# Patient Record
Sex: FEMALE | Race: WHITE | Employment: UNEMPLOYED | URBAN - METROPOLITAN AREA
[De-identification: names, ages, dates, MRNs, and addresses within clinical notes are randomized per-mention and may not be internally consistent; named-entity substitution may affect disease eponyms.]

---

## 2017-01-12 RX ORDER — CLONAZEPAM 1 MG/1
TABLET ORAL
Qty: 30 TABLET | Refills: 0 | Status: SHIPPED | OUTPATIENT
Start: 2017-01-12 | End: 2017-03-14

## 2017-01-12 RX ORDER — ZOLPIDEM TARTRATE 10 MG/1
TABLET ORAL
Qty: 30 TABLET | Refills: 0 | Status: SHIPPED | OUTPATIENT
Start: 2017-01-12 | End: 2017-04-13

## 2017-01-23 ENCOUNTER — OFFICE VISIT (OUTPATIENT)
Dept: PODIATRY CLINIC | Facility: CLINIC | Age: 52
End: 2017-01-23

## 2017-01-23 DIAGNOSIS — M72.2 PLANTAR FASCIITIS: ICD-10-CM

## 2017-01-23 DIAGNOSIS — M21.969 FOOT DEFORMITY, UNSPECIFIED LATERALITY: ICD-10-CM

## 2017-01-23 DIAGNOSIS — B35.1 ONYCHOMYCOSIS OF TOENAIL: ICD-10-CM

## 2017-01-23 DIAGNOSIS — M89.8X9 BONY EXOSTOSIS: Primary | ICD-10-CM

## 2017-01-23 PROCEDURE — 99213 OFFICE O/P EST LOW 20 MIN: CPT

## 2017-01-23 PROCEDURE — L3060 FOOT ARCH SUPP LONGITUD/META: HCPCS

## 2017-01-23 NOTE — PROGRESS NOTES
HPI:    Patient ID: Zay Negrete is a 45year old female. Foot Pain   The pain is present in the left foot (forefoot (after activity which was relived with rest)). This is a recurrent problem. There has been no history of extremity trauma.  The pr HYSTERECTOMY  2013    Comment Supracervical BYRON      Family History   Problem Relation Age of Onset   • Heart Disorder Maternal Grandmother      mitral valve repair   • Alcohol and Other Disorders Associated Brother      ETOH abuse   • Other [Other] Jessica Ivan significantly following cortisone injection during last visit. On exam - dorsal exostosis of left foot otherwise unremarkable  We have discussed treatement options with the patient. The patient inidcates her understanding and  agrees to the the options.  Thamas Necessary

## 2017-02-10 PROBLEM — M50.30 DEGENERATION OF CERVICAL INTERVERTEBRAL DISC: Status: ACTIVE | Noted: 2017-02-10

## 2017-02-10 PROBLEM — M79.18 MYOFASCIAL PAIN: Status: ACTIVE | Noted: 2017-02-10

## 2017-02-10 PROBLEM — M47.812 FACET ARTHRITIS OF CERVICAL REGION: Status: ACTIVE | Noted: 2017-02-10

## 2017-03-15 RX ORDER — CLONAZEPAM 1 MG/1
TABLET ORAL
Qty: 30 TABLET | Refills: 0 | Status: SHIPPED | OUTPATIENT
Start: 2017-03-15 | End: 2017-06-13

## 2017-03-27 ENCOUNTER — OFFICE VISIT (OUTPATIENT)
Dept: ENDOCRINOLOGY CLINIC | Facility: CLINIC | Age: 52
End: 2017-03-27

## 2017-03-27 VITALS
HEART RATE: 65 BPM | SYSTOLIC BLOOD PRESSURE: 113 MMHG | WEIGHT: 140 LBS | BODY MASS INDEX: 26.43 KG/M2 | HEIGHT: 61 IN | DIASTOLIC BLOOD PRESSURE: 73 MMHG

## 2017-03-27 DIAGNOSIS — Z78.0 MENOPAUSE: ICD-10-CM

## 2017-03-27 DIAGNOSIS — E03.9 HYPOTHYROIDISM, UNSPECIFIED TYPE: Primary | ICD-10-CM

## 2017-03-27 DIAGNOSIS — N95.1 HOT FLASHES DUE TO MENOPAUSE: ICD-10-CM

## 2017-03-27 PROCEDURE — 99212 OFFICE O/P EST SF 10 MIN: CPT | Performed by: INTERNAL MEDICINE

## 2017-03-27 PROCEDURE — 99214 OFFICE O/P EST MOD 30 MIN: CPT | Performed by: INTERNAL MEDICINE

## 2017-03-27 NOTE — PROGRESS NOTES
Return Office Visit     CHIEF COMPLAINT:  Hypothyroidism , Menopause, Hot flashes    HISTORY OF PRESENT ILLNESS:  Aby Tejada is a 47year old female with PMH of hypothyroidism, hysterectomy (oavries intact), fatigue, muscle aches (generalized); wh orthopnea  GI: Negative for:  abdominal pain, nausea, vomiting, diarrhea, constipation, bleeding  Neurology: Negative for: headache, numbness, weakness  Genito-Urinary: Negative for: dysuria, frequency  Psychiatric: Negative for:  depression, anxiety  Hema multivitamins containing Ca/iron  Stressed the importance of compliance with meds  Side effects of noncompliance including the development of myxedema coma and death discussed.     2.  hot flashes:  Will check FSH and estradiol  Will consider a trial of par

## 2017-03-31 ENCOUNTER — APPOINTMENT (OUTPATIENT)
Dept: LAB | Age: 52
End: 2017-03-31
Attending: INTERNAL MEDICINE
Payer: COMMERCIAL

## 2017-03-31 DIAGNOSIS — Z78.0 MENOPAUSE: ICD-10-CM

## 2017-03-31 DIAGNOSIS — E03.9 HYPOTHYROIDISM, UNSPECIFIED TYPE: ICD-10-CM

## 2017-03-31 PROCEDURE — 83001 ASSAY OF GONADOTROPIN (FSH): CPT

## 2017-03-31 PROCEDURE — 82670 ASSAY OF TOTAL ESTRADIOL: CPT

## 2017-03-31 PROCEDURE — 84443 ASSAY THYROID STIM HORMONE: CPT

## 2017-03-31 PROCEDURE — 84439 ASSAY OF FREE THYROXINE: CPT

## 2017-03-31 PROCEDURE — 36415 COLL VENOUS BLD VENIPUNCTURE: CPT

## 2017-04-02 ENCOUNTER — TELEPHONE (OUTPATIENT)
Dept: ENDOCRINOLOGY CLINIC | Facility: CLINIC | Age: 52
End: 2017-04-02

## 2017-04-02 NOTE — TELEPHONE ENCOUNTER
Please let the patient know that her labs indicate that she is post menopausal.  She has been having hot flashes, would she want to try paroxetine as discussed. If she wants to try, can give a one month of paroxetine 7.5 mg daily at bedtime.   She should n

## 2017-04-03 RX ORDER — PAROXETINE 7.5 MG/1
7.5 CAPSULE ORAL NIGHTLY
Qty: 30 CAPSULE | Refills: 0 | Status: SHIPPED | OUTPATIENT
Start: 2017-04-03 | End: 2017-05-03

## 2017-04-03 NOTE — TELEPHONE ENCOUNTER
Called patient. Informed her of Dr. Bradley Juárez message below. Patient would like to try Paroxetine. She understands not to stop in abruptly. Pended order and confirmed pharmacy.  Please review medication as I am not familiar with it and want to make sure i

## 2017-04-12 RX ORDER — LEVOTHYROXINE SODIUM 112 UG/1
TABLET ORAL
Qty: 30 TABLET | Refills: 6 | Status: SHIPPED | OUTPATIENT
Start: 2017-04-12 | End: 2017-11-01

## 2017-04-17 RX ORDER — ZOLPIDEM TARTRATE 10 MG/1
TABLET ORAL
Qty: 30 TABLET | Refills: 0 | Status: SHIPPED | OUTPATIENT
Start: 2017-04-17 | End: 2017-07-11

## 2017-04-17 NOTE — TELEPHONE ENCOUNTER
· PLEASE ADVISE ON REFILL. THANKS.    Recent Visits       Provider Department Primary Dx    8 months ago Brian Wilkinson DO [address] Family Medicine [place]    10 months ago Carlota , [address] [place] [address], [place] [id number], [place] Dysuria

## 2017-06-15 RX ORDER — CLONAZEPAM 1 MG/1
TABLET ORAL
Qty: 30 TABLET | Refills: 0 | Status: SHIPPED | OUTPATIENT
Start: 2017-06-15 | End: 2017-10-06

## 2017-07-12 RX ORDER — ZOLPIDEM TARTRATE 10 MG/1
TABLET ORAL
Qty: 30 TABLET | Refills: 0 | Status: SHIPPED | OUTPATIENT
Start: 2017-07-12 | End: 2017-09-13

## 2017-09-13 RX ORDER — ZOLPIDEM TARTRATE 10 MG/1
TABLET ORAL
Qty: 30 TABLET | Refills: 0 | Status: SHIPPED | OUTPATIENT
Start: 2017-09-13 | End: 2017-09-13

## 2017-09-13 RX ORDER — ZOLPIDEM TARTRATE 10 MG/1
TABLET ORAL
Qty: 30 TABLET | Refills: 0 | OUTPATIENT
Start: 2017-09-13 | End: 2017-12-15

## 2017-09-13 NOTE — TELEPHONE ENCOUNTER
Phoned in [facility] to pts pharmacy - Spoke with pts pharmacistAlejandro name as pt]/CURTIS SARMIENTO

## 2017-10-07 RX ORDER — CLONAZEPAM 1 MG/1
TABLET ORAL
Qty: 30 TABLET | Refills: 0 | OUTPATIENT
Start: 2017-10-07 | End: 2017-12-15

## 2017-11-01 RX ORDER — LEVOTHYROXINE SODIUM 112 UG/1
TABLET ORAL
Qty: 30 TABLET | Refills: 6 | Status: SHIPPED | OUTPATIENT
Start: 2017-11-01 | End: 2018-05-10

## 2017-11-01 NOTE — TELEPHONE ENCOUNTER
Please advise on refill request   Last office visit 8/5/16    Phone Room please call the patient and schedule an appointment. Thanks.       Hypothyroid Medications  Protocol Criteria:  Appointment scheduled in the past 12 months or the next 3 months  TSH

## 2017-11-30 ENCOUNTER — HOSPITAL ENCOUNTER (OUTPATIENT)
Dept: GENERAL RADIOLOGY | Age: 52
Discharge: HOME | End: 2017-11-30
Attending: PODIATRIST
Payer: COMMERCIAL

## 2017-11-30 ENCOUNTER — OFFICE VISIT (OUTPATIENT)
Dept: PODIATRY CLINIC | Facility: CLINIC | Age: 52
End: 2017-11-30

## 2017-11-30 DIAGNOSIS — M79.672 LEFT FOOT PAIN: ICD-10-CM

## 2017-11-30 DIAGNOSIS — M20.12 HALLUX VALGUS OF LEFT FOOT: ICD-10-CM

## 2017-11-30 DIAGNOSIS — M79.672 LEFT FOOT PAIN: Primary | ICD-10-CM

## 2017-11-30 PROCEDURE — 99212 OFFICE O/P EST SF 10 MIN: CPT | Performed by: PODIATRIST

## 2017-11-30 PROCEDURE — 73630 X-RAY EXAM OF FOOT: CPT | Performed by: PODIATRIST

## 2017-11-30 PROCEDURE — 99243 OFF/OP CNSLTJ NEW/EST LOW 30: CPT | Performed by: PODIATRIST

## 2017-12-15 NOTE — TELEPHONE ENCOUNTER
Pt requesting a refill on Ambien &       Current Outpatient Prescriptions:  CLONAZEPAM 1 MG Oral Tab TAKE 1 TABLET BY MOUTH EVERY EVENING AS NEEDED FOR ANXIETY Disp: 30 tablet Rfl: 0      states she needs it for sleep & pharmacy will not refill until she is seen for her Px

## 2017-12-16 RX ORDER — ZOLPIDEM TARTRATE 10 MG/1
TABLET ORAL
Qty: 30 TABLET | Refills: 0 | OUTPATIENT
Start: 2017-12-16 | End: 2018-03-02

## 2017-12-16 RX ORDER — CLONAZEPAM 1 MG/1
TABLET ORAL
Qty: 30 TABLET | Refills: 0 | OUTPATIENT
Start: 2017-12-16 | End: 2018-03-02

## 2018-01-12 ENCOUNTER — OFFICE VISIT (OUTPATIENT)
Dept: FAMILY MEDICINE CLINIC | Facility: CLINIC | Age: 53
End: 2018-01-12

## 2018-01-12 ENCOUNTER — TELEPHONE (OUTPATIENT)
Dept: PODIATRY CLINIC | Facility: CLINIC | Age: 53
End: 2018-01-12

## 2018-01-12 VITALS
WEIGHT: 144.38 LBS | HEART RATE: 62 BPM | DIASTOLIC BLOOD PRESSURE: 79 MMHG | BODY MASS INDEX: 27.26 KG/M2 | TEMPERATURE: 98 F | HEIGHT: 61 IN | SYSTOLIC BLOOD PRESSURE: 124 MMHG

## 2018-01-12 DIAGNOSIS — F41.0 PANIC ANXIETY SYNDROME: ICD-10-CM

## 2018-01-12 DIAGNOSIS — E78.2 MIXED HYPERLIPIDEMIA: ICD-10-CM

## 2018-01-12 DIAGNOSIS — G47.09 OTHER INSOMNIA: ICD-10-CM

## 2018-01-12 DIAGNOSIS — E03.9 HYPOTHYROIDISM, UNSPECIFIED TYPE: ICD-10-CM

## 2018-01-12 DIAGNOSIS — Z00.00 ROUTINE PHYSICAL EXAMINATION: Primary | ICD-10-CM

## 2018-01-12 PROCEDURE — 90471 IMMUNIZATION ADMIN: CPT | Performed by: FAMILY MEDICINE

## 2018-01-12 PROCEDURE — 90715 TDAP VACCINE 7 YRS/> IM: CPT | Performed by: FAMILY MEDICINE

## 2018-01-12 PROCEDURE — 99396 PREV VISIT EST AGE 40-64: CPT | Performed by: FAMILY MEDICINE

## 2018-01-12 PROCEDURE — 90686 IIV4 VACC NO PRSV 0.5 ML IM: CPT | Performed by: FAMILY MEDICINE

## 2018-01-12 PROCEDURE — 90472 IMMUNIZATION ADMIN EACH ADD: CPT | Performed by: FAMILY MEDICINE

## 2018-01-12 NOTE — TELEPHONE ENCOUNTER
Call to [facility].  Aware return call from Karla Fall may  not be today and may be next week

## 2018-01-12 NOTE — PROGRESS NOTES
Blood pressure 124/79, pulse 62, temperature 97.6 °F (36.4 °C), temperature source Oral, height 5' 1\" (1.549 m), weight 144 lb 6.4 oz (65.5 kg), last menstrual period 10/17/2008.     REASON FOR VISIT:    Shameka Farris is a 47year old female who pres 11/07/2014   CHOLEST 178 10/27/2007   CHOLEST 186 01/26/2006       Lab Results  Component Value Date   HDL 80 11/07/2014   HDL 67 (H) 10/27/2007   HDL 63 (H) 01/26/2006       Lab Results  Component Value Date   TRIGLY 65 11/07/2014   TRIGLY 65 10/27/2007 visit. Fecal Occult Blood  Annually No results found for: FOB, OCCULTSTOOL    Obesity Screening Screen all adults annually Body mass index is 27.28 kg/m².       Preventive Services for Which Recommendations Vary with Risk Recommendation Internal Lab or P GLYCOHEMOGLOBIN (HgA1c) (L) (%)   Date Value   10/14/2015 5.0    No flowsheet data found.     Creat/alb ratio  Annually Creatinine, Serum (mg/dL)   Date Value   11/07/2014 0.77     CREATININE (P) (mg/dL)   Date Value   10/14/2015 0.87        LDL  Annually L TONSILLECTOMY  2005: TUBAL LIGATION   Family History   Problem Relation Age of Onset   • Blood Disorder Father      PVD   • Other [OTHER] Father      Lupus   • 80.80 [OTHER] Father      Arthiosclerosis   • Stroke Mother    • Heart Disease Mother      CAD clear  EYES:PERRLA, EOMI, normal optic disk, conjunctiva are clear  NECK: supple, no adenopathy, no bruits  CHEST: no chest tenderness  BREAST: no dominant or suspicious mass  LUNGS: clear to auscultation  CARDIO: RRR without murmur  GI: good BS's, no mass GLUCOSE, SERUM; Future    3. Other insomnia  CLONAZEPAM    4. Hypothyroidism, unspecified type  LEVEL ORDERED     5.  Panic anxiety syndrome  STABLE PER PATIENT     LEFT LEG LESION ANTERIOR PATIENT HAS DERMATOLOGY APPOINTMENT IN [place]

## 2018-01-17 NOTE — TELEPHONE ENCOUNTER
Spoke to pt and tentatively scheduled surgery with SCR at [facility] 03/14/18. Pt will call back to confirm this date after checking with her work. Pt has [employer] PPO.    Informed pt that [facility] will call 1-2 days prior to surgery to discuss the following:  - Time

## 2018-01-18 NOTE — TELEPHONE ENCOUNTER
Spoke to pt and she states she will have to schedule surgery a different date. Surgery will be on 04/04/18 at [facility] with SCR. Pt verbalized understanding.

## 2018-01-19 ENCOUNTER — LAB ENCOUNTER (OUTPATIENT)
Dept: LAB | Age: 53
End: 2018-01-19
Attending: FAMILY MEDICINE
Payer: COMMERCIAL

## 2018-01-19 ENCOUNTER — HOSPITAL ENCOUNTER (OUTPATIENT)
Dept: MAMMOGRAPHY | Age: 53
Discharge: HOME | End: 2018-01-19
Attending: FAMILY MEDICINE
Payer: COMMERCIAL

## 2018-01-19 DIAGNOSIS — Z00.00 ROUTINE PHYSICAL EXAMINATION: ICD-10-CM

## 2018-01-19 DIAGNOSIS — E78.2 MIXED HYPERLIPIDEMIA: ICD-10-CM

## 2018-01-19 LAB
ALT SERPL-CCNC: 12 U/L (ref 14–54)
AST SERPL-CCNC: 17 U/L (ref 15–41)
CHOLEST SERPL-MCNC: 209 MG/DL (ref 110–200)
GLUCOSE SERPL-MCNC: 89 MG/DL (ref 70–99)
HDLC SERPL-MCNC: 88 MG/DL
LDLC SERPL CALC-MCNC: 106 MG/DL (ref 0–99)
NONHDLC SERPL-MCNC: 121 MG/DL
T4 FREE SERPL-MCNC: 1 NG/DL (ref 0.58–1.64)
TRIGL SERPL-MCNC: 74 MG/DL (ref 1–149)
TSH SERPL-ACNC: 7.06 UIU/ML (ref 0.45–5.33)

## 2018-01-19 PROCEDURE — 84443 ASSAY THYROID STIM HORMONE: CPT

## 2018-01-19 PROCEDURE — 80061 LIPID PANEL: CPT

## 2018-01-19 PROCEDURE — 82947 ASSAY GLUCOSE BLOOD QUANT: CPT

## 2018-01-19 PROCEDURE — 84439 ASSAY OF FREE THYROXINE: CPT

## 2018-01-19 PROCEDURE — 84460 ALANINE AMINO (ALT) (SGPT): CPT

## 2018-01-19 PROCEDURE — 36415 COLL VENOUS BLD VENIPUNCTURE: CPT

## 2018-01-19 PROCEDURE — 77067 SCR MAMMO BI INCL CAD: CPT | Performed by: FAMILY MEDICINE

## 2018-01-19 PROCEDURE — 84450 TRANSFERASE (AST) (SGOT): CPT

## 2018-01-31 NOTE — TELEPHONE ENCOUNTER
Called [facility] for PA for outpatient surgery. No PA required. Confirmation # [id number]. Faxed surgical scheduling form to [address]

## 2018-02-20 NOTE — TELEPHONE ENCOUNTER
Pt requesting refill on Current Outpatient Prescriptions:  ClonazePAM 1 MG Oral Tab TAKE 1 TABLET BY MOUTH EVERY EVENING AS NEEDED FOR ANXIETY Disp: 30 tablet Rfl: 0   Zolpidem Tartrate 10 MG Oral Tab TAKE 1 TABLET EVERY EVENING AS NEEDED FOR SLEEP Disp: 30 tablet Rfl: 0   Pt states her [facility]. And send rx to new pharmacy [facility] in [address], [address] [facility]

## 2018-02-21 RX ORDER — ZOLPIDEM TARTRATE 10 MG/1
TABLET ORAL
Qty: 30 TABLET | Refills: 0 | OUTPATIENT
Start: 2018-02-21

## 2018-02-21 RX ORDER — CLONAZEPAM 1 MG/1
TABLET ORAL
Qty: 30 TABLET | Refills: 0 | OUTPATIENT
Start: 2018-02-21

## 2018-03-02 NOTE — TELEPHONE ENCOUNTER
Refused by: Altagracia Haji, DO  Refusal reason: Unexpected request, review all meds with pt. Spoke with pt & stated she was seen by Dr Lesli Patel on 1-12-18  She forgot to get a refill of meds at that time, also she changed her pharm for the new year. Pt said she takes both meds ( clonazepam & zolpidem) for sleeping,  if MD is concerned & doesn't want her to take both meds, she is willing to alternate meds. Pt said she only refill both meds 3-4 x a year. She said she takes clonazepam in the middle of the noc if needed & if zolpidem doesn't work. Pt req rx refill. pls advise, thanks in advance. Pending Prescriptions Disp Refills    ClonazePAM 1 MG Oral Tab 30 tablet 0     Sig: TAKE 1 TABLET BY MOUTH EVERY EVENING AS NEEDED FOR ANXIETY      Zolpidem Tartrate 10 MG Oral Tab 30 tablet 0     Sig: TAKE 1 TABLET EVERY EVENING AS NEEDED FOR SLEEP       Refused Prescriptions Disp Refills    ClonazePAM 1 MG Oral Tab 30 tablet 0      Sig: TAKE 1 TABLET BY MOUTH EVERY EVENING AS NEEDED FOR ANXIETY        Refused By: Noemi Stewart        Reason for Refusal: Unexpected request, review all meds with pt. Zolpidem Tartrate 10 MG Oral Tab 30 tablet 0      Sig: TAKE 1 TABLET EVERY EVENING AS NEEDED FOR SLEEP        Refused By: Noemi Stewart        Reason for Refusal: Unexpected request, review all meds with pt.

## 2018-03-05 RX ORDER — CLONAZEPAM 1 MG/1
TABLET ORAL
Qty: 30 TABLET | Refills: 0 | OUTPATIENT
Start: 2018-03-05 | End: 2018-06-13

## 2018-03-05 RX ORDER — ZOLPIDEM TARTRATE 10 MG/1
TABLET ORAL
Qty: 30 TABLET | Refills: 0 | OUTPATIENT
Start: 2018-03-05 | End: 2018-06-13

## 2018-04-06 ENCOUNTER — TELEPHONE (OUTPATIENT)
Dept: PODIATRY CLINIC | Facility: CLINIC | Age: 53
End: 2018-04-06

## 2018-04-06 NOTE — TELEPHONE ENCOUNTER
Surgery 4-4-18, pt called to schedule one week follow up appt. Can pt be seen Thursday 4-12-18 before 1pm or Friday 4-13-18 anytime.   Call pt

## 2018-04-13 ENCOUNTER — OFFICE VISIT (OUTPATIENT)
Dept: PODIATRY CLINIC | Facility: CLINIC | Age: 53
End: 2018-04-13

## 2018-04-13 DIAGNOSIS — M20.12 HALLUX VALGUS OF LEFT FOOT: Primary | ICD-10-CM

## 2018-04-13 PROCEDURE — 99024 POSTOP FOLLOW-UP VISIT: CPT | Performed by: PODIATRIST

## 2018-04-13 PROCEDURE — 99212 OFFICE O/P EST SF 10 MIN: CPT | Performed by: PODIATRIST

## 2018-04-13 RX ORDER — OXICONAZOLE NITRATE 10 MG/G
CREAM TOPICAL
Refills: 0 | COMMUNITY
Start: 2018-01-23 | End: 2018-04-23

## 2018-04-13 RX ORDER — ACETAMINOPHEN AND CODEINE PHOSPHATE 300; 30 MG/1; MG/1
TABLET ORAL
Refills: 0 | COMMUNITY
Start: 2018-04-04 | End: 2018-05-14

## 2018-04-13 NOTE — PROGRESS NOTES
HPI:    Patient ID: Maxine Gutierrez is a 46year old female. HPI  This 50-year-old female presents 1 week post left bunionectomy. She reports no concerns. Review of Systems  I reviewed medical status, medications were noted.        Current Outpatie

## 2018-04-23 ENCOUNTER — OFFICE VISIT (OUTPATIENT)
Dept: PODIATRY CLINIC | Facility: CLINIC | Age: 53
End: 2018-04-23

## 2018-04-23 ENCOUNTER — HOSPITAL ENCOUNTER (OUTPATIENT)
Dept: GENERAL RADIOLOGY | Age: 53
Discharge: HOME | End: 2018-04-23
Attending: PODIATRIST
Payer: COMMERCIAL

## 2018-04-23 DIAGNOSIS — M20.12 HALLUX VALGUS OF LEFT FOOT: ICD-10-CM

## 2018-04-23 DIAGNOSIS — Z47.89 ORTHOPEDIC AFTERCARE: ICD-10-CM

## 2018-04-23 DIAGNOSIS — Z47.89 ORTHOPEDIC AFTERCARE: Primary | ICD-10-CM

## 2018-04-23 PROCEDURE — 99024 POSTOP FOLLOW-UP VISIT: CPT | Performed by: PODIATRIST

## 2018-04-23 PROCEDURE — 99212 OFFICE O/P EST SF 10 MIN: CPT | Performed by: PODIATRIST

## 2018-04-23 PROCEDURE — 73630 X-RAY EXAM OF FOOT: CPT | Performed by: PODIATRIST

## 2018-04-23 NOTE — PROGRESS NOTES
HPI:    Patient ID: Paul Sharp is a 46year old female. HPI  This 54-year-old female presents approximately 2 weeks post left bunionectomy. She has no noted specific concerns or problems.   Review of Systems         Current Outpatient Prescript

## 2018-05-11 ENCOUNTER — TELEPHONE (OUTPATIENT)
Dept: FAMILY MEDICINE CLINIC | Facility: CLINIC | Age: 53
End: 2018-05-11

## 2018-05-11 DIAGNOSIS — E03.8 OTHER SPECIFIED HYPOTHYROIDISM: Primary | ICD-10-CM

## 2018-05-11 RX ORDER — LEVOTHYROXINE SODIUM 112 UG/1
TABLET ORAL
Qty: 30 TABLET | Refills: 0 | Status: SHIPPED | OUTPATIENT
Start: 2018-05-11 | End: 2018-06-13

## 2018-05-14 ENCOUNTER — TELEPHONE (OUTPATIENT)
Dept: PODIATRY CLINIC | Facility: CLINIC | Age: 53
End: 2018-05-14

## 2018-05-14 NOTE — TELEPHONE ENCOUNTER
Pt states she has pain at night from bunionectomy, would like refill on tylenol #3 to use only at night. Pls advise thank you.

## 2018-05-14 NOTE — TELEPHONE ENCOUNTER
Spoke to pt and she is requesting refill of T#3. Pt had a left foot bunionectomy on 04/04/18. Pain comes and goes. Foot has swelling with burning, electrical shock, throbbing type pain. States she has been icing foot for the pain.  States incision site feel

## 2018-05-14 NOTE — TELEPHONE ENCOUNTER
Spoke to pt and informed her that [facility] approved T#3 and I will call it into her pharmacy. Verified pharmacy with pt. Pt verbalized understanding.

## 2018-05-14 NOTE — TELEPHONE ENCOUNTER
[address] [facility] and  with T#3 order and [facility] DAMIEN #.

## 2018-05-15 RX ORDER — ACETAMINOPHEN AND CODEINE PHOSPHATE 300; 30 MG/1; MG/1
TABLET ORAL
Qty: 20 TABLET | Refills: 0 | OUTPATIENT
Start: 2018-05-15 | End: 2018-07-17

## 2018-05-17 ENCOUNTER — OFFICE VISIT (OUTPATIENT)
Dept: PODIATRY CLINIC | Facility: CLINIC | Age: 53
End: 2018-05-17

## 2018-05-17 DIAGNOSIS — M20.12 HALLUX VALGUS OF LEFT FOOT: Primary | ICD-10-CM

## 2018-05-17 PROCEDURE — 99212 OFFICE O/P EST SF 10 MIN: CPT | Performed by: PODIATRIST

## 2018-05-17 PROCEDURE — 99024 POSTOP FOLLOW-UP VISIT: CPT | Performed by: PODIATRIST

## 2018-05-17 NOTE — PROGRESS NOTES
HPI:    Patient ID: Nima Luevano is a 46year old female. HPI  A 2year-old female presents 5 weeks post left bunionectomy. She is quite active and in doing so has a bit of swelling and discomfort towards the end of the day.   She reports no feel

## 2018-06-05 ENCOUNTER — PATIENT MESSAGE (OUTPATIENT)
Dept: FAMILY MEDICINE CLINIC | Facility: CLINIC | Age: 53
End: 2018-06-05

## 2018-06-06 NOTE — TELEPHONE ENCOUNTER
From: Sly Ramos  To: Rich Haider DO  Sent: 6/5/2018 11:28 PM CDT  Subject: Other    I would like to have thyroid tested

## 2018-06-12 ENCOUNTER — LAB ENCOUNTER (OUTPATIENT)
Dept: LAB | Age: 53
End: 2018-06-12
Attending: FAMILY MEDICINE
Payer: COMMERCIAL

## 2018-06-12 DIAGNOSIS — E03.8 OTHER SPECIFIED HYPOTHYROIDISM: ICD-10-CM

## 2018-06-12 PROCEDURE — 84443 ASSAY THYROID STIM HORMONE: CPT

## 2018-06-12 PROCEDURE — 84439 ASSAY OF FREE THYROXINE: CPT

## 2018-06-12 PROCEDURE — 36415 COLL VENOUS BLD VENIPUNCTURE: CPT

## 2018-06-12 PROCEDURE — 84480 ASSAY TRIIODOTHYRONINE (T3): CPT

## 2018-06-13 RX ORDER — LEVOTHYROXINE SODIUM 0.1 MG/1
100 TABLET ORAL DAILY
Qty: 30 TABLET | Refills: 2 | Status: CANCELLED
Start: 2018-06-13 | End: 2019-06-08

## 2018-06-13 RX ORDER — CLONAZEPAM 1 MG/1
TABLET ORAL
Qty: 30 TABLET | Refills: 0 | Status: CANCELLED
Start: 2018-06-13

## 2018-06-13 RX ORDER — ZOLPIDEM TARTRATE 10 MG/1
TABLET ORAL
Qty: 30 TABLET | Refills: 0 | Status: CANCELLED
Start: 2018-06-13

## 2018-06-13 NOTE — TELEPHONE ENCOUNTER
[facility] [place]: please review. LOV 1/12/18    Both meds last prescribed 3/5/18.

## 2018-06-13 NOTE — TELEPHONE ENCOUNTER
From: Imtiaz Haro  Sent: 6/13/2018 8:42 AM CDT  Subject: Medication Renewal Request    Asuncion Vivas. Patric Cortes would like a refill of the following medications:     ClonazePAM 1 MG Oral Tab [Ramy Villegas, DO]     Zolpidem Tartrate 10 MG Oral Tab

## 2018-06-14 RX ORDER — ZOLPIDEM TARTRATE 10 MG/1
TABLET ORAL
Qty: 30 TABLET | Refills: 0 | Status: SHIPPED
Start: 2018-06-14 | End: 2018-09-30

## 2018-06-14 RX ORDER — CLONAZEPAM 1 MG/1
TABLET ORAL
Qty: 30 TABLET | Refills: 0 | Status: SHIPPED
Start: 2018-06-14 | End: 2018-09-30

## 2018-06-14 NOTE — TELEPHONE ENCOUNTER
From: Jacbo Richey  Sent: 6/13/2018 11:56 AM CDT  Subject: Medication Renewal Request    Nurys Davison. Arturo Redding would like a refill of the following medications:     ClonazePAM 1 MG Oral Tab [Ramy Villegas, DO]   Patient Comment: Please fill.  I re

## 2018-06-21 ENCOUNTER — OFFICE VISIT (OUTPATIENT)
Dept: RHEUMATOLOGY | Facility: CLINIC | Age: 53
End: 2018-06-21

## 2018-06-21 ENCOUNTER — APPOINTMENT (OUTPATIENT)
Dept: LAB | Age: 53
End: 2018-06-21
Attending: INTERNAL MEDICINE
Payer: COMMERCIAL

## 2018-06-21 VITALS
HEART RATE: 63 BPM | HEIGHT: 61 IN | BODY MASS INDEX: 27.94 KG/M2 | SYSTOLIC BLOOD PRESSURE: 145 MMHG | DIASTOLIC BLOOD PRESSURE: 87 MMHG | WEIGHT: 148 LBS

## 2018-06-21 DIAGNOSIS — M25.50 POLYARTHRALGIA: ICD-10-CM

## 2018-06-21 DIAGNOSIS — M79.7 FIBROMYALGIA: Primary | ICD-10-CM

## 2018-06-21 DIAGNOSIS — E55.9 VITAMIN D DEFICIENCY: ICD-10-CM

## 2018-06-21 DIAGNOSIS — M79.7 FIBROMYALGIA: ICD-10-CM

## 2018-06-21 PROCEDURE — 86140 C-REACTIVE PROTEIN: CPT

## 2018-06-21 PROCEDURE — 82085 ASSAY OF ALDOLASE: CPT

## 2018-06-21 PROCEDURE — 99212 OFFICE O/P EST SF 10 MIN: CPT | Performed by: INTERNAL MEDICINE

## 2018-06-21 PROCEDURE — 99244 OFF/OP CNSLTJ NEW/EST MOD 40: CPT | Performed by: INTERNAL MEDICINE

## 2018-06-21 PROCEDURE — 85652 RBC SED RATE AUTOMATED: CPT

## 2018-06-21 PROCEDURE — 85027 COMPLETE CBC AUTOMATED: CPT

## 2018-06-21 PROCEDURE — 82306 VITAMIN D 25 HYDROXY: CPT

## 2018-06-21 PROCEDURE — 80053 COMPREHEN METABOLIC PANEL: CPT

## 2018-06-21 PROCEDURE — 82550 ASSAY OF CK (CPK): CPT

## 2018-06-21 PROCEDURE — 36415 COLL VENOUS BLD VENIPUNCTURE: CPT

## 2018-06-21 RX ORDER — CYCLOBENZAPRINE HCL 5 MG
TABLET ORAL
Qty: 60 TABLET | Refills: 1 | Status: SHIPPED | OUTPATIENT
Start: 2018-06-21 | End: 2018-06-29 | Stop reason: ALTCHOICE

## 2018-06-21 NOTE — PROGRESS NOTES
Nikkie Lawrence is a 46year old female who presents for Patient presents with:  Osteoarthritis  Joint Pain: shoulders  Muscle Pain: neck  . HPI:     I had the pleasure of seeing Nikkie Lawrence on 6/21/2018 for evaluation.      She is a pleasant  throwing her in [facility]. She continues to get counseling after their divorce and feels she has PTSD from this. Her joitn pain and muscle pain got worse after this as well.    Her joint pain and stiffness is next worst.   Her migraines are then the last NEC     PACs   • Hx of diseases NEC     carpal tunnel syndrome   • Lipid screening 2009    per NG   • Mitral valve disorders(424.0)    • Pregnancy     x4    • Unspecified hypothyroidism     Medication      Past Surgical History:  10/15/2013: Valente Maldonado SOB, no Cough, No Pleurtic pain,   GI: No nausea, no vomiiting, no abominal pain, no hx of ulcer, no gastritis, no heartburn, no dyshpagia, no BRBPR or melena  Loose stools,   : no dysuria, she has 4 childrens, no hx of miscarriages, no DVT Hx, no hx of MM/HR 11   HEPATITIS C VIRUS ANTIBODY      NonReactive Non-Reactive   C-Citrullinated Peptide IgG AB      0.0 - 6.9 U/ML 1.4   C-REACTIVE PROTEIN      0.0 - 0.9 mg/dL < 0.5   RHEUMATOID FACTOR      0.0 - 24.9 IU/mL < 20.0   COMPLEMENT C3      88 - 201 mg/d

## 2018-06-21 NOTE — PATIENT INSTRUCTIONS
1. Check labs  2. Try cyclobenzaprine 5mg at night - ok to increase to 10mg if needed- watch for drowsines  3. Gerard chi   4. Return to clinic in 1-2 months.

## 2018-06-24 PROBLEM — I10 HIGH BLOOD PRESSURE: Status: ACTIVE | Noted: 2018-06-24

## 2018-06-24 PROBLEM — E55.9 VITAMIN D DEFICIENCY: Status: ACTIVE | Noted: 2018-06-24

## 2018-06-24 PROBLEM — Z86.39 HISTORY OF VITAMIN D DEFICIENCY: Status: ACTIVE | Noted: 2018-06-24

## 2018-06-24 PROBLEM — Z86.69 HISTORY OF MIGRAINE HEADACHES: Status: ACTIVE | Noted: 2018-06-24

## 2018-06-24 PROBLEM — M79.7 FIBROMYALGIA: Status: ACTIVE | Noted: 2018-06-24

## 2018-06-28 ENCOUNTER — PATIENT MESSAGE (OUTPATIENT)
Dept: RHEUMATOLOGY | Facility: CLINIC | Age: 53
End: 2018-06-28

## 2018-06-29 NOTE — TELEPHONE ENCOUNTER
Please see below and advise. Message sent to pt labs normal. Pain med? ? F/U appt not scheduled at this time.

## 2018-06-29 NOTE — TELEPHONE ENCOUNTER
From: Richard Cuellar  To: Doe Meyers MD  Sent: 6/28/2018 10:29 AM CDT  Subject: Visit Follow-up Question    I would like you to explain my lab results. I also requested something for pain. Thank you.

## 2018-07-02 RX ORDER — GABAPENTIN 300 MG/1
300 CAPSULE ORAL NIGHTLY
Qty: 30 CAPSULE | Refills: 1 | Status: SHIPPED | OUTPATIENT
Start: 2018-07-02 | End: 2018-07-17

## 2018-07-09 ENCOUNTER — PATIENT MESSAGE (OUTPATIENT)
Dept: RHEUMATOLOGY | Facility: CLINIC | Age: 53
End: 2018-07-09

## 2018-07-09 NOTE — TELEPHONE ENCOUNTER
From: Aby Tejada  To: Chele Chow MD  Sent: 7/9/2018 11:13 AM CDT  Subject: Prescription Question    Thank you for your reply. I don’t need pain medicine for at night. I am experiencing extreme (7-9) muscular and joint (hips, spine) pain.

## 2018-07-10 NOTE — TELEPHONE ENCOUNTER
No Tylenol #3. Gabapentin 300mg at bedtime should not cause drowsiness into the day. I agree that it is a great choice. Please send in if patient agreeable. Thanks.

## 2018-07-10 NOTE — TELEPHONE ENCOUNTER
Pt called in to follow up on question. Pt states that for 1/2 hour to 1 full hour she cannot move due to pain and had an incident that she had urinated on herself, because she couldn't move, due to pain.  *Pt in tears while she relays this information*  She

## 2018-07-10 NOTE — TELEPHONE ENCOUNTER
Left detailed message for pt to try gabapentin 300 mg at bedtime, it should not cause daytime drowsiness. Call office back with any questions.

## 2018-07-10 NOTE — TELEPHONE ENCOUNTER
Please see below and advise. Reports pain and stiffness in the morning in lower back, hips, and legs (shooting pain). She has difficulty getting out of bed in the morning.  Pain is a 8-9/10, she has to crawl to get around in the am. Reports she has urinated

## 2018-07-17 PROBLEM — M75.41 IMPINGEMENT SYNDROME OF RIGHT SHOULDER: Status: ACTIVE | Noted: 2018-07-17

## 2018-07-17 PROBLEM — G56.01 CARPAL TUNNEL SYNDROME OF RIGHT WRIST: Status: ACTIVE | Noted: 2018-07-17

## 2018-07-27 PROBLEM — M51.36 DDD (DEGENERATIVE DISC DISEASE), LUMBAR: Status: ACTIVE | Noted: 2018-07-27

## 2018-07-27 PROBLEM — M48.061 SPINAL STENOSIS OF LUMBAR REGION, UNSPECIFIED WHETHER NEUROGENIC CLAUDICATION PRESENT: Status: ACTIVE | Noted: 2018-07-27

## 2018-07-27 PROBLEM — M51.369 DDD (DEGENERATIVE DISC DISEASE), LUMBAR: Status: ACTIVE | Noted: 2018-07-27

## 2018-07-27 PROBLEM — M54.16 LUMBAR RADICULITIS: Status: ACTIVE | Noted: 2018-07-27

## 2018-08-16 ENCOUNTER — TELEPHONE (OUTPATIENT)
Dept: RHEUMATOLOGY | Facility: CLINIC | Age: 53
End: 2018-08-16

## 2018-08-16 NOTE — TELEPHONE ENCOUNTER
She should just be on one - can you call pt.  And let her know that I will stop refilling medication if she is using the other one - if she is not taking the other one please let us know

## 2018-08-16 NOTE — TELEPHONE ENCOUNTER
CYCLOBENZAPRINE 5MG    PER PHARMACY PT IS GETTING TIZANIDINE FROM ANOTHER DR. IS PT ON BOTH MUSCLE RELAXANTS OR SHOULD THEY JUST BE ON ONE?

## 2018-08-16 NOTE — TELEPHONE ENCOUNTER
Please see message below and advise.      Order History   Outpatient   Date/Time Action Taken User Additional Information   06/21/18 1003 [address] Meliton Akbar MD    06/29/18 Dianna Jones, PA-C Reason: Alternate therapy   Provider Inf

## 2018-08-17 NOTE — TELEPHONE ENCOUNTER
Pharmacy is calling to check on status of message should they continue to release medication to pt, pharmacy would like a call to let them know what they need to do .

## 2018-08-30 RX ORDER — GABAPENTIN 300 MG/1
300 CAPSULE ORAL NIGHTLY
Qty: 30 CAPSULE | Refills: 3 | Status: SHIPPED | OUTPATIENT
Start: 2018-08-30 | End: 2019-01-24

## 2018-08-30 RX ORDER — GABAPENTIN 300 MG/1
300 CAPSULE ORAL NIGHTLY
Refills: 1 | COMMUNITY
Start: 2018-08-02 | End: 2018-08-30

## 2018-08-30 NOTE — TELEPHONE ENCOUNTER
Pharmacy is requesting a refill for GABAPENTIN 300 MG CAPSULES. For a quantity of 30. Please advise.

## 2018-09-06 RX ORDER — LEVOTHYROXINE SODIUM 0.1 MG/1
TABLET ORAL
Qty: 90 TABLET | Refills: 3 | Status: SHIPPED | OUTPATIENT
Start: 2018-09-06 | End: 2019-09-09

## 2018-10-01 RX ORDER — CLONAZEPAM 1 MG/1
TABLET ORAL
Qty: 30 TABLET | Refills: 0 | Status: SHIPPED | OUTPATIENT
Start: 2018-10-01 | End: 2018-12-05

## 2018-10-01 RX ORDER — ZOLPIDEM TARTRATE 10 MG/1
TABLET ORAL
Qty: 30 TABLET | Refills: 0 | Status: SHIPPED | OUTPATIENT
Start: 2018-10-01 | End: 2018-12-05

## 2018-10-10 ENCOUNTER — OFFICE VISIT (OUTPATIENT)
Dept: FAMILY MEDICINE CLINIC | Facility: CLINIC | Age: 53
End: 2018-10-10
Payer: COMMERCIAL

## 2018-10-10 ENCOUNTER — TELEPHONE (OUTPATIENT)
Dept: OTHER | Age: 53
End: 2018-10-10

## 2018-10-10 VITALS
WEIGHT: 152 LBS | HEIGHT: 61 IN | HEART RATE: 80 BPM | SYSTOLIC BLOOD PRESSURE: 113 MMHG | TEMPERATURE: 97 F | BODY MASS INDEX: 28.7 KG/M2 | DIASTOLIC BLOOD PRESSURE: 76 MMHG

## 2018-10-10 DIAGNOSIS — R31.9 URINARY TRACT INFECTION WITH HEMATURIA, SITE UNSPECIFIED: Primary | ICD-10-CM

## 2018-10-10 DIAGNOSIS — N39.0 URINARY TRACT INFECTION WITH HEMATURIA, SITE UNSPECIFIED: Primary | ICD-10-CM

## 2018-10-10 PROCEDURE — 99212 OFFICE O/P EST SF 10 MIN: CPT | Performed by: FAMILY MEDICINE

## 2018-10-10 PROCEDURE — 99213 OFFICE O/P EST LOW 20 MIN: CPT | Performed by: FAMILY MEDICINE

## 2018-10-10 PROCEDURE — 81003 URINALYSIS AUTO W/O SCOPE: CPT | Performed by: FAMILY MEDICINE

## 2018-10-10 RX ORDER — SULFAMETHOXAZOLE AND TRIMETHOPRIM 800; 160 MG/1; MG/1
1 TABLET ORAL 2 TIMES DAILY
Qty: 10 TABLET | Refills: 0 | Status: SHIPPED | OUTPATIENT
Start: 2018-10-10 | End: 2019-01-24

## 2018-10-10 RX ORDER — CIPROFLOXACIN 500 MG/1
500 TABLET, FILM COATED ORAL 2 TIMES DAILY
Qty: 10 TABLET | Refills: 0 | Status: SHIPPED | OUTPATIENT
Start: 2018-10-10 | End: 2018-10-10

## 2018-10-10 NOTE — PROGRESS NOTES
Blood pressure 113/76, pulse 80, temperature 97.3 °F (36.3 °C), temperature source Oral, height 5' 1\" (1.549 m), weight 152 lb (68.9 kg), last menstrual period 10/17/2008.     Patient presents today complaining of dysuria and blood in the urine since this

## 2018-11-27 NOTE — TELEPHONE ENCOUNTER
Requested Prescriptions     Pending Prescriptions Disp Refills   • CLONAZEPAM 1 MG Oral Tab [Pharmacy Med Name: CLONAZEPAM 1MG TABLETS] 30 tablet 0     Sig: TAKE 1 TABLET BY MOUTH EVERY EVENING AS NEEDED FOR ANXIETY   • Zolpidem Tartrate 10 MG Oral Tab [Ph

## 2018-11-28 RX ORDER — ZOLPIDEM TARTRATE 10 MG/1
TABLET ORAL
Qty: 30 TABLET | Refills: 0
Start: 2018-11-28

## 2018-11-28 RX ORDER — CLONAZEPAM 1 MG/1
TABLET ORAL
Qty: 30 TABLET | Refills: 0
Start: 2018-11-28

## 2018-12-07 PROBLEM — M53.3 SACROILIAC JOINT DYSFUNCTION OF LEFT SIDE: Status: ACTIVE | Noted: 2018-12-07

## 2018-12-07 PROBLEM — M47.816 LUMBAR FACET ARTHROPATHY: Status: ACTIVE | Noted: 2018-12-07

## 2019-01-10 ENCOUNTER — TELEPHONE (OUTPATIENT)
Dept: FAMILY MEDICINE CLINIC | Facility: CLINIC | Age: 54
End: 2019-01-10

## 2019-01-10 NOTE — TELEPHONE ENCOUNTER
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

## 2019-01-10 NOTE — TELEPHONE ENCOUNTER
Pt called requesting referral for spine specialist [facility]     Phone :[phone]  T:[phone]

## 2019-02-08 ENCOUNTER — OFFICE VISIT (OUTPATIENT)
Dept: FAMILY MEDICINE CLINIC | Facility: CLINIC | Age: 54
End: 2019-02-08
Payer: COMMERCIAL

## 2019-02-08 VITALS
WEIGHT: 148 LBS | BODY MASS INDEX: 27.94 KG/M2 | HEART RATE: 55 BPM | DIASTOLIC BLOOD PRESSURE: 70 MMHG | SYSTOLIC BLOOD PRESSURE: 106 MMHG | HEIGHT: 61 IN

## 2019-02-08 DIAGNOSIS — M48.061 NEUROFORAMINAL STENOSIS OF LUMBAR SPINE: ICD-10-CM

## 2019-02-08 DIAGNOSIS — E03.9 HYPOTHYROIDISM, UNSPECIFIED TYPE: ICD-10-CM

## 2019-02-08 DIAGNOSIS — Z01.818 PREOPERATIVE GENERAL PHYSICAL EXAMINATION: Primary | ICD-10-CM

## 2019-02-08 PROCEDURE — 99243 OFF/OP CNSLTJ NEW/EST LOW 30: CPT | Performed by: FAMILY MEDICINE

## 2019-02-08 PROCEDURE — 99212 OFFICE O/P EST SF 10 MIN: CPT | Performed by: FAMILY MEDICINE

## 2019-02-08 RX ORDER — ZOLPIDEM TARTRATE 10 MG/1
TABLET ORAL
Qty: 30 TABLET | Refills: 0 | Status: SHIPPED | OUTPATIENT
Start: 2019-02-08 | End: 2019-06-19

## 2019-02-08 NOTE — PROGRESS NOTES
Blood pressure 106/70, pulse 55, height 5' 1\" (1.549 m), weight 148 lb (67.1 kg), last menstrual period 10/17/2008. REASON FOR VISIT:    Richard Cuellar is a 46year old female who presents for an [address]         Patient Active Pro all adults annually Body mass index is 27.96 kg/m².      Preventive Services for Which Recommendations Vary with Risk Recommendation Internal Lab or Procedure   Cholesterol Screening Recommended screening varies with age, risk and gender LDL Cholesterol Mohan LEVOTHYROXINE SODIUM 100 MCG Oral Tab TAKE 1 TABLET(100 MCG) BY MOUTH DAILY Disp: 90 tablet Rfl: 3      MEDICAL INFORMATION:   Past Medical History:   Diagnosis Date   • Anxiety state, unspecified    • Arthritis     left foot   • Bunion of right foot [address]         ETOH abuse   • Stroke Other         Family H/O   • Psoriasis Son    • Other (Other) Son         hypothyroidism   • Diabetes Paternal Aunt    • Colon Cancer Neg       SOCIAL HISTORY:   Social History    Tobacco Use      Smoking status: Never are grossly intact      ASSESSMENT AND OTHER RELEVANT CHRONIC CONDITIONS:   Maxine Gutierrez is a 46year old female who presents for an [address]      PLAN SUMMARY:   Diagnoses and all orders for this visit:    Preoperative general physi await results of testing prior to clearance.

## 2019-02-08 NOTE — TELEPHONE ENCOUNTER
Spoke to patient who is requesting a refill on her alprazolam. Order pended for MD approval. Patient stated she would also like Dr. Dayanara Jerome to know she made an appointment with Dr. Marjan Dsouza for a second opinion.

## 2019-02-19 ENCOUNTER — LAB ENCOUNTER (OUTPATIENT)
Dept: LAB | Age: 54
End: 2019-02-19
Attending: FAMILY MEDICINE
Payer: COMMERCIAL

## 2019-02-19 DIAGNOSIS — E03.9 HYPOTHYROIDISM, UNSPECIFIED TYPE: ICD-10-CM

## 2019-02-19 DIAGNOSIS — Z01.818 PREOPERATIVE GENERAL PHYSICAL EXAMINATION: Primary | ICD-10-CM

## 2019-02-19 DIAGNOSIS — M48.061 NEUROFORAMINAL STENOSIS OF LUMBAR SPINE: ICD-10-CM

## 2019-02-19 LAB
ANION GAP SERPL CALC-SCNC: 8 MMOL/L (ref 0–18)
BASOPHILS # BLD AUTO: 0.02 X10(3) UL (ref 0–0.2)
BASOPHILS NFR BLD AUTO: 0.4 %
BILIRUB UR QL: NEGATIVE
BUN BLD-MCNC: 10 MG/DL (ref 7–18)
BUN/CREAT SERPL: 10.8 (ref 10–20)
CALCIUM BLD-MCNC: 9.5 MG/DL (ref 8.5–10.1)
CHLORIDE SERPL-SCNC: 105 MMOL/L (ref 98–107)
CHOLEST SMN-MCNC: 215 MG/DL (ref ?–200)
CLARITY UR: CLEAR
CO2 SERPL-SCNC: 27 MMOL/L (ref 21–32)
COLOR UR: YELLOW
CREAT BLD-MCNC: 0.93 MG/DL (ref 0.55–1.02)
DEPRECATED RDW RBC AUTO: 44.6 FL (ref 35.1–46.3)
EOSINOPHIL # BLD AUTO: 0.09 X10(3) UL (ref 0–0.7)
EOSINOPHIL NFR BLD AUTO: 1.6 %
ERYTHROCYTE [DISTWIDTH] IN BLOOD BY AUTOMATED COUNT: 13.2 % (ref 11–15)
GLUCOSE BLD-MCNC: 100 MG/DL (ref 70–99)
GLUCOSE UR-MCNC: NEGATIVE MG/DL
HCT VFR BLD AUTO: 42.7 % (ref 35–48)
HDLC SERPL-MCNC: 70 MG/DL (ref 40–59)
HGB BLD-MCNC: 13.8 G/DL (ref 12–16)
HGB UR QL STRIP.AUTO: NEGATIVE
IMM GRANULOCYTES # BLD AUTO: 0.01 X10(3) UL (ref 0–1)
IMM GRANULOCYTES NFR BLD: 0.2 %
INR BLD: 1.1 (ref 0.9–1.2)
KETONES UR-MCNC: NEGATIVE MG/DL
LDLC SERPL CALC-MCNC: 114 MG/DL (ref ?–100)
LYMPHOCYTES # BLD AUTO: 1.12 X10(3) UL (ref 1–4)
LYMPHOCYTES NFR BLD AUTO: 20.3 %
MCH RBC QN AUTO: 29.7 PG (ref 26–34)
MCHC RBC AUTO-ENTMCNC: 32.3 G/DL (ref 31–37)
MCV RBC AUTO: 91.8 FL (ref 80–100)
MONOCYTES # BLD AUTO: 0.43 X10(3) UL (ref 0.1–1)
MONOCYTES NFR BLD AUTO: 7.8 %
NEUTROPHILS # BLD AUTO: 3.85 X10 (3) UL (ref 1.5–7.7)
NEUTROPHILS # BLD AUTO: 3.85 X10(3) UL (ref 1.5–7.7)
NEUTROPHILS NFR BLD AUTO: 69.7 %
NITRITE UR QL STRIP.AUTO: NEGATIVE
NONHDLC SERPL-MCNC: 145 MG/DL (ref ?–130)
OSMOLALITY SERPL CALC.SUM OF ELEC: 289 MOSM/KG (ref 275–295)
PH UR: 6 [PH] (ref 5–8)
PLATELET # BLD AUTO: 229 10(3)UL (ref 150–450)
POTASSIUM SERPL-SCNC: 4 MMOL/L (ref 3.5–5.1)
PROT UR-MCNC: NEGATIVE MG/DL
PROTHROMBIN TIME: 13.4 SECONDS (ref 11.8–14.5)
RBC # BLD AUTO: 4.65 X10(6)UL (ref 3.8–5.3)
RBC #/AREA URNS AUTO: <1 /HPF
SODIUM SERPL-SCNC: 140 MMOL/L (ref 136–145)
SP GR UR STRIP: 1.01 (ref 1–1.03)
TRIGL SERPL-MCNC: 153 MG/DL (ref 30–149)
TSI SER-ACNC: 1.84 MIU/ML (ref 0.36–3.74)
UROBILINOGEN UR STRIP-ACNC: <2
VIT C UR-MCNC: NEGATIVE MG/DL
WBC # BLD AUTO: 5.5 X10(3) UL (ref 4–11)
WBC #/AREA URNS AUTO: 2 /HPF

## 2019-02-19 PROCEDURE — 84443 ASSAY THYROID STIM HORMONE: CPT

## 2019-02-19 PROCEDURE — 81001 URINALYSIS AUTO W/SCOPE: CPT

## 2019-02-19 PROCEDURE — 93010 ELECTROCARDIOGRAM REPORT: CPT | Performed by: FAMILY MEDICINE

## 2019-02-19 PROCEDURE — 93005 ELECTROCARDIOGRAM TRACING: CPT

## 2019-02-19 PROCEDURE — 80061 LIPID PANEL: CPT

## 2019-02-19 PROCEDURE — 85025 COMPLETE CBC W/AUTO DIFF WBC: CPT

## 2019-02-19 PROCEDURE — 80048 BASIC METABOLIC PNL TOTAL CA: CPT

## 2019-02-19 PROCEDURE — 85610 PROTHROMBIN TIME: CPT

## 2019-02-19 PROCEDURE — 36415 COLL VENOUS BLD VENIPUNCTURE: CPT

## 2019-02-25 ENCOUNTER — TELEPHONE (OUTPATIENT)
Dept: FAMILY MEDICINE CLINIC | Facility: CLINIC | Age: 54
End: 2019-02-25

## 2019-02-26 ENCOUNTER — HOSPITAL (OUTPATIENT)
Dept: OTHER | Age: 54
End: 2019-02-26
Attending: ORTHOPAEDIC SURGERY

## 2019-02-26 ENCOUNTER — HOSPITAL (OUTPATIENT)
Dept: OTHER | Age: 54
End: 2019-02-26

## 2019-04-17 ENCOUNTER — HOSPITAL (OUTPATIENT)
Dept: OTHER | Age: 54
End: 2019-04-17
Attending: ORTHOPAEDIC SURGERY

## 2019-04-17 ENCOUNTER — HOSPITAL (OUTPATIENT)
Dept: OTHER | Age: 54
End: 2019-04-17

## 2019-04-19 ENCOUNTER — PATIENT OUTREACH (OUTPATIENT)
Dept: CASE MANAGEMENT | Age: 54
End: 2019-04-19

## 2019-04-19 DIAGNOSIS — Z02.9 ENCOUNTERS FOR ADMINISTRATIVE PURPOSE: ICD-10-CM

## 2019-04-19 LAB — PATHOLOGIST NAME: NORMAL

## 2019-04-19 NOTE — PROGRESS NOTES
S/w patient, HIPAA verified. Patient states that she was discharged on 4/18/2019. She states that she had a revision of the spinal surgery that she had on 2/28/2019.  She states that although she is grateful for the follow up call, there is no need to follo

## 2019-05-16 ENCOUNTER — OFFICE VISIT (OUTPATIENT)
Dept: FAMILY MEDICINE CLINIC | Facility: CLINIC | Age: 54
End: 2019-05-16
Payer: COMMERCIAL

## 2019-05-16 ENCOUNTER — NURSE TRIAGE (OUTPATIENT)
Dept: OTHER | Age: 54
End: 2019-05-16

## 2019-05-16 VITALS
SYSTOLIC BLOOD PRESSURE: 129 MMHG | HEART RATE: 105 BPM | DIASTOLIC BLOOD PRESSURE: 87 MMHG | HEIGHT: 61 IN | BODY MASS INDEX: 27 KG/M2 | WEIGHT: 143 LBS

## 2019-05-16 DIAGNOSIS — Z12.11 ENCOUNTER FOR SCREENING COLONOSCOPY: ICD-10-CM

## 2019-05-16 DIAGNOSIS — Z12.31 SCREENING MAMMOGRAM, ENCOUNTER FOR: Primary | ICD-10-CM

## 2019-05-16 PROCEDURE — 99212 OFFICE O/P EST SF 10 MIN: CPT | Performed by: FAMILY MEDICINE

## 2019-05-16 PROCEDURE — 99213 OFFICE O/P EST LOW 20 MIN: CPT | Performed by: FAMILY MEDICINE

## 2019-05-16 NOTE — PATIENT INSTRUCTIONS
Colonoscopy     A camera attached to a flexible tube with a viewing lens is used to take video pictures.      Colonoscopy is a test to view the inside of your lower digestive tract (colon and rectum). Sometimes it can show the last part of the small intes · The healthcare provider will first give you a physical exam to check for anal and rectal problems. · Then the anus is lubricated and the scope inserted. · If you are awake, you may have a feeling similar to needing to have a bowel movement.  You may als

## 2019-05-16 NOTE — PROGRESS NOTES
Blood pressure 129/87, pulse 105, height 5' 1\" (1.549 m), weight 143 lb (64.9 kg), last menstrual period 10/17/2008. Patient presents today following up after spinal fusion and correction surgeries.   She reports that she no longer has debilitating nerv

## 2019-05-16 NOTE — TELEPHONE ENCOUNTER
Action Requested: Summary for Provider     []  Critical Lab, Recommendations Needed  [] Need Additional Advice  []   FYI    []   Need Orders  [] Need Medications Sent to Pharmacy  []  Other     SUMMARY: appt scheduled today with Dr. Asher Álvarez.  Pt states

## 2019-05-21 ENCOUNTER — OFFICE VISIT (OUTPATIENT)
Dept: ENDOCRINOLOGY CLINIC | Facility: CLINIC | Age: 54
End: 2019-05-21
Payer: COMMERCIAL

## 2019-05-21 VITALS
BODY MASS INDEX: 27 KG/M2 | HEART RATE: 71 BPM | WEIGHT: 144 LBS | DIASTOLIC BLOOD PRESSURE: 93 MMHG | SYSTOLIC BLOOD PRESSURE: 137 MMHG

## 2019-05-21 DIAGNOSIS — N95.1 HOT FLASHES DUE TO MENOPAUSE: Primary | ICD-10-CM

## 2019-05-21 DIAGNOSIS — E03.9 HYPOTHYROIDISM, UNSPECIFIED TYPE: ICD-10-CM

## 2019-05-21 PROCEDURE — 99214 OFFICE O/P EST MOD 30 MIN: CPT | Performed by: INTERNAL MEDICINE

## 2019-05-21 NOTE — PROGRESS NOTES
Return Office Visit     CHIEF COMPLAINT:  Hypothyroidism, Hot flashes     HISTORY OF PRESENT ILLNESS:  Mirian Juan is a 53year old female who presents for a FU for  Hypothyroidism:  She was diagnosed at age 33 after the birth of her son and startstalin reviewed. See past family history marked as reviewed. See past social history marked as reviewed.     ASSESSMENTS:     REVIEW OF SYSTEMS:  Constitutional: Negative for: weight change, fever, cold/heat intolerance, + body aches, + fatigue  Eyes: Negative f life; every am one hour before breakfast and atleast four hours apart from other meds especially calcium, iron, multivitamins containing Ca/iron  Stressed the importance of compliance with meds  Side effects of noncompliance including the development of my

## 2019-06-19 ENCOUNTER — TELEPHONE (OUTPATIENT)
Dept: FAMILY MEDICINE CLINIC | Facility: CLINIC | Age: 54
End: 2019-06-19

## 2019-06-19 NOTE — TELEPHONE ENCOUNTER
Pt called in stating that she wants to thank  [facility] for her [address]. She states that she is sleeping a lot better being off of the gabapentin. She states she is however struggling with sleep because of that spinal surgery, but overall has improved.

## 2019-06-20 RX ORDER — ZOLPIDEM TARTRATE 10 MG/1
TABLET ORAL
Qty: 30 TABLET | Refills: 0 | OUTPATIENT
Start: 2019-06-20 | End: 2019-09-24

## 2019-06-20 NOTE — TELEPHONE ENCOUNTER
Controlled medication pending for review. If approved needs to be called in or faxed by on-site staff.     Last Rx:2/8/19  LOV: Recent Visits  Date Type Provider Dept   05/16/19 Office Visit DO Len [facility]   02/08/19 Office Visit Elaine

## 2019-06-21 ENCOUNTER — TELEPHONE (OUTPATIENT)
Dept: ENDOCRINOLOGY CLINIC | Facility: CLINIC | Age: 54
End: 2019-06-21

## 2019-06-21 NOTE — TELEPHONE ENCOUNTER
Called pt, no side effects. States hot flashes were reduced by 85-95% most days. States she likes it and would like to continue, hence asking for a refill.

## 2019-06-21 NOTE — TELEPHONE ENCOUNTER
Patient was to call with with an update after she starts the estrogen patch. How is she doing on it? Any SE?   If not, then okay to refill  Thanks

## 2019-06-21 NOTE — TELEPHONE ENCOUNTER
Current Outpatient Medications:              estradiol 0.025 MG/24HR Transdermal Patch Weekly Place 1 patch onto the skin once a week.  Disp: 4 patch Rfl: 0

## 2019-06-26 NOTE — TELEPHONE ENCOUNTER
Pt unsure of aura since pain woke her up from sleep @6am this morning. Nothing helped until she took antinausea (Ondansetron 4mg) med and vicodin - pt filling much better now. Confirms sensitivity to light and debilitating pain w/ nausea.      Only had migr

## 2019-06-26 NOTE — TELEPHONE ENCOUNTER
Spoke w/ Douglas Beal and discussed message below. She is agreeable to taking off the patch and touching base on Monday.

## 2019-06-26 NOTE — TELEPHONE ENCOUNTER
Let us take a break for a few days Let us touch base on My chart around Monday and we can try to resume the patch. I will also like to look up the use of the patch in patient's with migraine.  I will get back to her  Thanks

## 2019-07-02 ENCOUNTER — TELEPHONE (OUTPATIENT)
Dept: ENDOCRINOLOGY CLINIC | Facility: CLINIC | Age: 54
End: 2019-07-02

## 2019-07-02 NOTE — TELEPHONE ENCOUNTER
Dr Jorge Hemphill - Pt is agreeable to wait until 7/8 and not wear patch per [facility]. However pt c/o persistent hot flashes and \"feeling worse every day\". Pt asking if labs r needed to check levels in the meantime. Pt willing to book apt to discuss.  Please advise

## 2019-07-02 NOTE — TELEPHONE ENCOUNTER
Spoke w/ Maude Rodriguez. She has been off estradiol patch about a week. Stopped due to migraine. Per AM, patient was to check in this week and let us know how she's doing. Not experiencing migraines or HA's, but is having continual hot flashes at night.  She's ask

## 2019-07-02 NOTE — TELEPHONE ENCOUNTER
Pt has not been experiencing headaches but is  having continuous hot flashes.  Please call [phone]

## 2019-07-02 NOTE — TELEPHONE ENCOUNTER
I suspect the migraine will recur if she resumes estradiol patch. Ideally I would prefer she wait for advice from AM - hold the patch this week. Thanks.

## 2019-07-08 NOTE — TELEPHONE ENCOUNTER
Patient really wanting to re-try hormonal patch given severity of hot flashes. She's asking if you think it'd be worth trying patch for less days per week - possibly 4-5 days out of the week? Patient is Ok with MyChart response.

## 2019-07-08 NOTE — TELEPHONE ENCOUNTER
Reviewed notes. It seems like the migraines are a SE of the patches. I do not suggest that she resume the medication.    Given hot flashes, I do recommend that she FU with gynecology to decided on further management  Thanks

## 2019-07-09 NOTE — TELEPHONE ENCOUNTER
I am sorry that she has been having symptoms  I understand her wish to resume the patch since it did make her feel better, however, since it could have caused a possible SE.  It is better to be safe  Hence, I suggest FU with gynecology  If she does not have

## 2019-08-28 PROBLEM — G90.522 COMPLEX REGIONAL PAIN SYNDROME TYPE 1 OF LEFT LOWER EXTREMITY: Status: ACTIVE | Noted: 2019-08-28

## 2019-08-28 PROBLEM — M54.42 CHRONIC LEFT-SIDED LOW BACK PAIN WITH LEFT-SIDED SCIATICA: Status: ACTIVE | Noted: 2019-08-28

## 2019-08-28 PROBLEM — M48.061 LUMBAR FORAMINAL STENOSIS: Status: ACTIVE | Noted: 2019-08-28

## 2019-08-28 PROBLEM — G89.29 CHRONIC LEFT-SIDED LOW BACK PAIN WITH LEFT-SIDED SCIATICA: Status: ACTIVE | Noted: 2019-08-28

## 2019-08-28 PROBLEM — S73.192A TEAR OF LEFT ACETABULAR LABRUM, INITIAL ENCOUNTER: Status: ACTIVE | Noted: 2019-08-28

## 2019-09-05 ENCOUNTER — HOSPITAL ENCOUNTER (OUTPATIENT)
Dept: MRI IMAGING | Age: 54
Discharge: HOME | End: 2019-09-05
Attending: PHYSICAL MEDICINE & REHABILITATION
Payer: COMMERCIAL

## 2019-09-05 DIAGNOSIS — S73.192A TEAR OF LEFT ACETABULAR LABRUM, INITIAL ENCOUNTER: ICD-10-CM

## 2019-09-05 DIAGNOSIS — G89.29 CHRONIC LEFT-SIDED LOW BACK PAIN WITH LEFT-SIDED SCIATICA: ICD-10-CM

## 2019-09-05 DIAGNOSIS — M48.061 LUMBAR FORAMINAL STENOSIS: ICD-10-CM

## 2019-09-05 DIAGNOSIS — M54.42 CHRONIC LEFT-SIDED LOW BACK PAIN WITH LEFT-SIDED SCIATICA: ICD-10-CM

## 2019-09-05 PROCEDURE — 72158 MRI LUMBAR SPINE W/O & W/DYE: CPT | Performed by: PHYSICAL MEDICINE & REHABILITATION

## 2019-09-05 PROCEDURE — A9575 INJ GADOTERATE MEGLUMI 0.1ML: HCPCS | Performed by: PHYSICAL MEDICINE & REHABILITATION

## 2019-09-05 NOTE — PROGRESS NOTES
Released to Noe Jeong, along with my comments. Pt to come in and review/discuss options. Please refer to her MyChart comments.

## 2019-09-09 RX ORDER — LEVOTHYROXINE SODIUM 0.1 MG/1
TABLET ORAL
Qty: 90 TABLET | Refills: 1 | Status: SHIPPED | OUTPATIENT
Start: 2019-09-09 | End: 2020-03-04

## 2019-09-10 NOTE — TELEPHONE ENCOUNTER
Refill passed per [facility], [facility] protocol.   Hypothyroid Medications  Protocol Criteria:  Appointment scheduled in the past 12 months or the next 3 months  TSH resulted in the past 12 months that is normal  Recent Outpatient Visits            3 days ago Phylicia Harris

## 2019-09-12 RX ORDER — CLONAZEPAM 1 MG/1
TABLET ORAL
Qty: 30 TABLET | Refills: 0 | Status: SHIPPED
Start: 2019-09-12 | End: 2019-11-08

## 2019-09-12 NOTE — TELEPHONE ENCOUNTER
Controlled medication pending for review. If approved needs to be called in or faxed by on-site staff.     Requested Prescriptions     Pending Prescriptions Disp Refills   • CLONAZEPAM 1 MG Oral Tab [Pharmacy Med Name: CLONAZEPAM 1MG TABLETS] 30 tablet 0

## 2019-09-24 ENCOUNTER — OFFICE VISIT (OUTPATIENT)
Dept: FAMILY MEDICINE CLINIC | Facility: CLINIC | Age: 54
End: 2019-09-24

## 2019-09-24 VITALS
BODY MASS INDEX: 26.47 KG/M2 | DIASTOLIC BLOOD PRESSURE: 82 MMHG | SYSTOLIC BLOOD PRESSURE: 130 MMHG | HEART RATE: 89 BPM | WEIGHT: 140.19 LBS | HEIGHT: 61 IN

## 2019-09-24 DIAGNOSIS — Z86.39 HISTORY OF VITAMIN D DEFICIENCY: ICD-10-CM

## 2019-09-24 DIAGNOSIS — Z00.00 ROUTINE PHYSICAL EXAMINATION: Primary | ICD-10-CM

## 2019-09-24 DIAGNOSIS — M48.061 NEUROFORAMINAL STENOSIS OF LUMBAR SPINE: ICD-10-CM

## 2019-09-24 DIAGNOSIS — F41.0 PANIC ANXIETY SYNDROME: ICD-10-CM

## 2019-09-24 DIAGNOSIS — Z98.1 S/P LUMBAR SPINAL FUSION: ICD-10-CM

## 2019-09-24 DIAGNOSIS — Z01.419 ROUTINE GYNECOLOGICAL EXAMINATION: ICD-10-CM

## 2019-09-24 DIAGNOSIS — G47.09 OTHER INSOMNIA: ICD-10-CM

## 2019-09-24 DIAGNOSIS — M54.16 LUMBAR RADICULITIS: ICD-10-CM

## 2019-09-24 DIAGNOSIS — E78.2 MIXED HYPERLIPIDEMIA: ICD-10-CM

## 2019-09-24 DIAGNOSIS — E03.9 HYPOTHYROIDISM, UNSPECIFIED TYPE: ICD-10-CM

## 2019-09-24 DIAGNOSIS — Z12.11 ENCOUNTER FOR SCREENING COLONOSCOPY: ICD-10-CM

## 2019-09-24 PROCEDURE — 99396 PREV VISIT EST AGE 40-64: CPT | Performed by: FAMILY MEDICINE

## 2019-09-24 PROCEDURE — 90471 IMMUNIZATION ADMIN: CPT | Performed by: FAMILY MEDICINE

## 2019-09-24 PROCEDURE — 99213 OFFICE O/P EST LOW 20 MIN: CPT | Performed by: FAMILY MEDICINE

## 2019-09-24 PROCEDURE — 90686 IIV4 VACC NO PRSV 0.5 ML IM: CPT | Performed by: FAMILY MEDICINE

## 2019-09-24 RX ORDER — ZOLPIDEM TARTRATE 10 MG/1
TABLET ORAL
Qty: 30 TABLET | Refills: 0 | Status: SHIPPED | OUTPATIENT
Start: 2019-09-24 | End: 2019-09-30

## 2019-09-24 NOTE — PROGRESS NOTES
REASON FOR VISIT:    Beulah Shin is a 53year old female who presents for an [address]. Following up for chronic pain s/p lumbar fusion. Still has left leg pain minimal relief with lyrica.   Also complains of insomnia only relieved or Procedure   Colonoscopy Screen Every 10 years Colonoscopy due on 02/16/2014   Flex Sigmoidoscopy Screen  Every 5 years No results found for this or any previous visit.    Fecal Occult Blood  Annually No results found for: FOB, OCCULTSTOOL   Obesity Scree FOR ANXIETY Disp: 30 tablet Rfl: 0   DULOXETINE HCL 20 MG Oral Cap DR Particles TAKE 1 CAPSULE(20 MG) BY MOUTH DAILY Disp: 30 capsule Rfl: 3   HYDROcodone-acetaminophen (NORCO)  MG Oral Tab Take 1 tablet by mouth every 8 (eight) hours as needed for P 11/1/2018    Performed by Addis Hidalgo MD at [address] [place] [address] N/A 9/20/2018    Performed by Addis Hidalgo MD at [address] [place] [address] N/A 7/27/2018    Performed by Addis Hidalgo MD at  or anxiety  HEMATOLOGIC: denies hx of anemia  ENDOCRINE: denies thyroid history  ALL/ASTHMA: denies hx of allergy or asthma  NO BLOOD IN STOOL      EXAM:   /82   Pulse 89   Ht 5' 1\" (1.549 m)   Wt 140 lb 3 oz (63.6 kg)   LMP 10/17/2008   BMI 26.49 k are no preventive care reminders to display for this patient. Tdap: There are no preventive care reminders to display for this patient.   Shingles: Shingrix shingles vaccine is due    Influenza Annually   Pneumococcal if high risk   Td/Tdap once then every

## 2019-09-30 ENCOUNTER — OFFICE VISIT (OUTPATIENT)
Dept: PAIN CLINIC | Facility: HOSPITAL | Age: 54
End: 2019-09-30
Attending: FAMILY MEDICINE
Payer: COMMERCIAL

## 2019-09-30 VITALS — HEIGHT: 61 IN | BODY MASS INDEX: 25.49 KG/M2 | WEIGHT: 135 LBS

## 2019-09-30 DIAGNOSIS — G90.522 COMPLEX REGIONAL PAIN SYNDROME TYPE 1 OF LEFT LOWER EXTREMITY: Primary | ICD-10-CM

## 2019-09-30 RX ORDER — ZOLPIDEM TARTRATE 10 MG/1
TABLET ORAL
Qty: 30 TABLET | Refills: 2 | Status: SHIPPED | OUTPATIENT
Start: 2019-09-30 | End: 2020-03-06

## 2019-09-30 NOTE — CHRONIC PAIN
Initial Consultation Note      HISTORY OF PRESENT ILLNESS:  Jacob Richey is a 53year old old female referred to the pain clinic  for evaluation treatment of her left groin and left anterior thigh pain Is a very nice 54-year-old white female who had Disp: 12 patch Rfl: 0        ALLERGIES:  No Known Allergies    SURGICAL HISTORY:  Past Surgical History:   Procedure Laterality Date   • ELECTROCARDIOGRAM, COMPLETE  10/15/2013    Scanned to Media Tab   • FOOT SURGERY Right 2010    Bunionectomy   • FOOT NICHOLSON (degenerative disc disease), lumbar     Lumbar radiculitis     Lumbar facet arthropathy     Sacroiliac joint dysfunction of left side     Chronic left-sided low back pain with left-sided sciatica     Lumbar foraminal stenosis     Tear of left acetabular la on file    Tobacco Use      Smoking status: Never Smoker      Smokeless tobacco: Never Used    Substance and Sexual Activity      Alcohol use:  Yes        Alcohol/week: 8.0 standard drinks        Types: 8 Standard drinks or equivalent per week        Commen full  Cervical Spine  Flexion   Extension  MOTOR EXAMINATION:  UPPER EXTREMITY      LEFT RIGHT   Deltoid 5/5 5/5   Biceps 5/5 5/5   Triceps 5/5 5/5   Brachioradialis 5/5 5/5   Wrist Flexors 5/5 5/5   Wrist Extensors 5/5 5/5   Intrinsic Hand 5/5 5/5    distal cord and nerve roots have normal caliber, contour, and signal intensity. Sacral Tarlov cysts. PARASPINAL AREA: No visible mass. OTHER: Negative.   LUMBAR DISC LEVELS: L1-L2: No significant disc/facet abnormality, spinal stenosis, or foraminal steno 13.0 10/18/2011       [place] [facility] MONITORING PROGRAM REVIEWED  Yes      ASSESSMENT:   53year old old female with status post lumbar fusion L2-3 with good results for her low back pain  Severe pain over the left groin and anterior thigh with signifi

## 2019-09-30 NOTE — PROGRESS NOTES
Pt presents to [facility] ambulatory ref by Dr. Lian Beach for left groin, thigh. Pt had a fusion 02/2019 and a revision 04/2019 At L2-3. Pt has tried gabapetin and was very sick flu like symptoms.  Pt is currently taking Lyrica 75mg BID, she feels like it has helpe

## 2019-10-09 ENCOUNTER — TELEPHONE (OUTPATIENT)
Dept: PAIN CLINIC | Facility: HOSPITAL | Age: 54
End: 2019-10-09

## 2019-10-09 ENCOUNTER — DOCUMENTATION ONLY (OUTPATIENT)
Dept: PAIN CLINIC | Facility: HOSPITAL | Age: 54
End: 2019-10-09

## 2019-10-09 NOTE — PROGRESS NOTES
Procedure code  [address] @ # [phone] @ 8:00am    Spoke with Iris, No PA needed    Ref # [id number] will reach out to pt and schedule procedure

## 2019-10-10 ENCOUNTER — OFFICE VISIT (OUTPATIENT)
Dept: NEUROLOGY | Facility: CLINIC | Age: 54
End: 2019-10-10
Payer: COMMERCIAL

## 2019-10-10 VITALS
WEIGHT: 135 LBS | HEIGHT: 61 IN | DIASTOLIC BLOOD PRESSURE: 88 MMHG | SYSTOLIC BLOOD PRESSURE: 130 MMHG | HEART RATE: 60 BPM | RESPIRATION RATE: 20 BRPM | BODY MASS INDEX: 25.49 KG/M2

## 2019-10-10 DIAGNOSIS — G47.00 INSOMNIA, UNSPECIFIED TYPE: ICD-10-CM

## 2019-10-10 DIAGNOSIS — M16.12 PRIMARY OSTEOARTHRITIS OF LEFT HIP: ICD-10-CM

## 2019-10-10 DIAGNOSIS — F41.0 PANIC ANXIETY SYNDROME: ICD-10-CM

## 2019-10-10 DIAGNOSIS — M96.1 POSTLAMINECTOMY SYNDROME OF LUMBAR REGION: ICD-10-CM

## 2019-10-10 DIAGNOSIS — M79.7 FIBROMYALGIA: ICD-10-CM

## 2019-10-10 DIAGNOSIS — F32.A DEPRESSIVE DISORDER: ICD-10-CM

## 2019-10-10 DIAGNOSIS — G89.4 CHRONIC PAIN SYNDROME: Primary | ICD-10-CM

## 2019-10-10 PROBLEM — G89.29 CHRONIC PAIN: Status: ACTIVE | Noted: 2019-10-10

## 2019-10-10 PROCEDURE — 99215 OFFICE O/P EST HI 40 MIN: CPT | Performed by: PHYSICAL MEDICINE & REHABILITATION

## 2019-10-10 NOTE — PROGRESS NOTES
[address]  Progress Note    CHIEF COMPLAINT:  Patient presents with:  Hip Pain: Patient presents for left hip and left groin pain, worsened since june.  Patient c/o a burning pain in left thigh, also hip and Date   • Anxiety state, unspecified    • Arthritis     left foot   • Bunion of right foot 2010   • Hx of diseases NEC     PALPITATIONS   • Hx of diseases NEC     condyloma treated with laser in past   • Hx of diseases NEC     PACs   • Hx of diseases NEC Arthiosclerosis   • Stroke Mother    • Heart Disease Mother         CAD   • Other (Aneurysm) Mother         Brain bleed.  Now in wheelchair with atrophy/nut got better w/ PT.   • Heart Disorder Maternal Grandmother         mitral valve repair   • Alcohol an denies   Psychiatric  Anxiety: denies  Depressed Mood: denies          All other systems reviewed and are negative. Pertinent positives and negatives noted in the HPI.         PHYSICAL EXAM:   /88 (BP Location: Right arm, Patient Position: Sitting, Cu she has CRPS however. 2. Postlaminectomy syndrome of lumbar region  I think she has a chronic L2 radiculopathy. Her surgery looks good radiographically.   In the long run, I think neuropathic pain medicines will probably serve her best.    3. Primary os

## 2019-10-20 PROBLEM — M96.1 POSTLAMINECTOMY SYNDROME OF LUMBAR REGION: Status: ACTIVE | Noted: 2019-10-20

## 2019-10-20 PROBLEM — M16.12 PRIMARY OSTEOARTHRITIS OF LEFT HIP: Status: ACTIVE | Noted: 2019-10-20

## 2019-10-22 ENCOUNTER — OFFICE VISIT (OUTPATIENT)
Dept: PAIN CLINIC | Facility: HOSPITAL | Age: 54
End: 2019-10-22
Attending: NURSE PRACTITIONER
Payer: COMMERCIAL

## 2019-10-22 VITALS — SYSTOLIC BLOOD PRESSURE: 115 MMHG | HEART RATE: 64 BPM | RESPIRATION RATE: 18 BRPM | DIASTOLIC BLOOD PRESSURE: 66 MMHG

## 2019-10-22 DIAGNOSIS — M48.061 LUMBAR FORAMINAL STENOSIS: ICD-10-CM

## 2019-10-22 DIAGNOSIS — Z98.1 S/P LUMBAR FUSION: ICD-10-CM

## 2019-10-22 DIAGNOSIS — M47.816 LUMBAR FACET ARTHROPATHY: ICD-10-CM

## 2019-10-22 DIAGNOSIS — M54.42 CHRONIC LEFT-SIDED LOW BACK PAIN WITH LEFT-SIDED SCIATICA: Primary | ICD-10-CM

## 2019-10-22 DIAGNOSIS — M51.36 DDD (DEGENERATIVE DISC DISEASE), LUMBAR: ICD-10-CM

## 2019-10-22 DIAGNOSIS — G89.29 CHRONIC LEFT-SIDED LOW BACK PAIN WITH LEFT-SIDED SCIATICA: Primary | ICD-10-CM

## 2019-10-22 PROCEDURE — 99211 OFF/OP EST MAY X REQ PHY/QHP: CPT

## 2019-10-22 NOTE — CHRONIC PAIN
Follow-up Note  CC: follow up s/p lumbar epidural sympathetic block on 10/17/19  HISTORY OF PRESENT ILLNESS:  Agustin Presley is a 53year old old female, originally referred to the pain clinic by  No ref.  provider found, with history of Chronic lef tab of norco. She state that she typically does this at nighttime. She states that she has made a prescription of norco last \"since April. \"     PAIN COURSE AND PREVIOUS INTERVENTIONS:  Medications: Lyrica     ALLERGIES:  No Known Allergies    MEDICATION migraine headaches     High blood pressure     Impingement syndrome of right shoulder     Carpal tunnel syndrome of right wrist     Spinal stenosis of lumbar region, unspecified whether neurogenic claudication present     DDD (degenerative disc disease), l INJECTION Left 12/14/2018    Performed by Farnaz Cuellar MD at [address]   • TUBAL LIGATION  2005       FAMILY HISTORY:  Family History   Problem Relation Age of Onset   • Blood Disorder Father         PVD   • Not on file        Attends meetings of clubs or organizations: Not on file        Relationship status: Not on file      Intimate partner violence:        Fear of current or ex partner: Not on file        Emotionally abused: Not on file        Physically ab suspected pathology.   Images were performed before and after the administration of intravenous gadolinium   contrast.       FINDINGS:          NUMERATION: For the purposes of this examination, the lowest fully formed disc space is designated as L5-S1.  ALI at 10:18       Approved by (CST): Eva Ortega MD on 9/05/2019 at 10:29            DATE OF SERVICE: 07.08.2019     MRI HIPS, LEFT (CPT=73721), MRI PELVIS (BONY)(CPT=72195)   CLINICAL INDICATION: Severe left hip pain for 3 months.   TECHNIQUE: Multiplanar osseous lesions are seen in the bony pelvis. The right and left sacroiliac joints are intact, without evidence of diastasis or ankylosis.  No  discrete osseous erosive change, subchondral sclerosis or subchondral marrow edema is seen adjacent  to either SI at the last office visit. Patient is tearful and then not throughout the appointment when discussing narcotic prescriptions. She has filled 19 prescriptions for narcotics in the past 10 months.  I told her that I agree with the previous providers that t

## 2019-10-22 NOTE — PROGRESS NOTES
Pt presents to [facility] ambulatory for f/u after having a Lumbar sympathetic block on 10/17/19. Pt states she didn't have any relief with the block. Pt is only taking Lyrica at bed time b/c is makes her dizzy.  ALF Thomson to see pt see notes for POC

## 2019-10-31 PROBLEM — G57.10: Status: ACTIVE | Noted: 2019-10-31

## 2019-10-31 PROBLEM — M54.16 LUMBAR RADICULOPATHY, CHRONIC: Status: ACTIVE | Noted: 2018-07-27

## 2019-11-11 ENCOUNTER — APPOINTMENT (OUTPATIENT)
Dept: LAB | Age: 54
End: 2019-11-11
Attending: FAMILY MEDICINE
Payer: COMMERCIAL

## 2019-11-11 DIAGNOSIS — E78.2 MIXED HYPERLIPIDEMIA: ICD-10-CM

## 2019-11-11 DIAGNOSIS — E03.9 HYPOTHYROIDISM, UNSPECIFIED TYPE: ICD-10-CM

## 2019-11-11 DIAGNOSIS — Z86.39 HISTORY OF VITAMIN D DEFICIENCY: ICD-10-CM

## 2019-11-11 DIAGNOSIS — Z01.818 PREOP TESTING: ICD-10-CM

## 2019-11-11 PROCEDURE — 82306 VITAMIN D 25 HYDROXY: CPT

## 2019-11-11 PROCEDURE — 80053 COMPREHEN METABOLIC PANEL: CPT

## 2019-11-11 PROCEDURE — 36415 COLL VENOUS BLD VENIPUNCTURE: CPT

## 2019-11-11 PROCEDURE — 87641 MR-STAPH DNA AMP PROBE: CPT

## 2019-11-11 PROCEDURE — 80061 LIPID PANEL: CPT

## 2019-11-11 PROCEDURE — 84443 ASSAY THYROID STIM HORMONE: CPT

## 2019-11-12 ENCOUNTER — TELEPHONE (OUTPATIENT)
Dept: FAMILY MEDICINE CLINIC | Facility: CLINIC | Age: 54
End: 2019-11-12

## 2019-11-12 ENCOUNTER — TELEPHONE (OUTPATIENT)
Dept: INTERNAL MEDICINE CLINIC | Facility: CLINIC | Age: 54
End: 2019-11-12

## 2019-11-12 DIAGNOSIS — M54.10 RADICULOPATHY, UNSPECIFIED SPINAL REGION: Primary | ICD-10-CM

## 2019-11-14 ENCOUNTER — OFFICE VISIT (OUTPATIENT)
Dept: FAMILY MEDICINE CLINIC | Facility: CLINIC | Age: 54
End: 2019-11-14
Payer: COMMERCIAL

## 2019-11-14 ENCOUNTER — TELEPHONE (OUTPATIENT)
Dept: FAMILY MEDICINE CLINIC | Facility: CLINIC | Age: 54
End: 2019-11-14

## 2019-11-14 VITALS
BODY MASS INDEX: 26.06 KG/M2 | HEIGHT: 61 IN | WEIGHT: 138 LBS | DIASTOLIC BLOOD PRESSURE: 82 MMHG | SYSTOLIC BLOOD PRESSURE: 116 MMHG | HEART RATE: 72 BPM

## 2019-11-14 DIAGNOSIS — M54.10 RADICULOPATHY, UNSPECIFIED SPINAL REGION: ICD-10-CM

## 2019-11-14 DIAGNOSIS — M48.061 NEUROFORAMINAL STENOSIS OF LUMBAR SPINE: ICD-10-CM

## 2019-11-14 DIAGNOSIS — Z01.818 PREOP GENERAL PHYSICAL EXAM: Primary | ICD-10-CM

## 2019-11-14 DIAGNOSIS — Z86.39 HISTORY OF VITAMIN D DEFICIENCY: ICD-10-CM

## 2019-11-14 DIAGNOSIS — F41.0 PANIC ANXIETY SYNDROME: ICD-10-CM

## 2019-11-14 DIAGNOSIS — M54.16 LUMBAR RADICULITIS: ICD-10-CM

## 2019-11-14 DIAGNOSIS — Z98.1 S/P LUMBAR SPINAL FUSION: ICD-10-CM

## 2019-11-14 PROCEDURE — 99214 OFFICE O/P EST MOD 30 MIN: CPT | Performed by: FAMILY MEDICINE

## 2019-11-14 PROCEDURE — 99212 OFFICE O/P EST SF 10 MIN: CPT | Performed by: FAMILY MEDICINE

## 2019-11-14 RX ORDER — CLONAZEPAM 1 MG/1
TABLET ORAL
Qty: 30 TABLET | Refills: 0 | OUTPATIENT
Start: 2019-11-14

## 2019-11-14 NOTE — PROGRESS NOTES
Blood pressure 116/82, pulse 72, height 5' 1\" (1.549 m), weight 138 lb (62.6 kg), last menstrual period 10/17/2008. REASON FOR VISIT:    Darleen Bentley is a 53year old female who presents for an [address]         Patient Active years No results found for this or any previous visit. Fecal Occult Blood  Annually No results found for: FOB, OCCULTSTOOL   Obesity Screening Screen all adults annually Body mass index is 26.07 kg/m².      Preventive Services for Which Recommendations Va capsule 0   • Zolpidem Tartrate 10 MG Oral Tab TAKE 1 TABLET BY MOUTH AT BEDTIME AS NEEDED FOR SLEEP 30 tablet 2   • LEVOTHYROXINE SODIUM 100 MCG Oral Tab TAKE 1 TABLET(100 MCG) BY MOUTH DAILY 90 tablet 1   • HYDROcodone-acetaminophen (NORCO)  MG Ora PVD   • Other (Other) Father         Lupus   • Other (46.80) Father         Arthiosclerosis   • Stroke Mother    • Heart Disease Mother         CAD   • Other (Aneurysm) Mother         Brain bleed.  Now in wheelchair with atrophy/nut got better w/ PT.   • H adenopathy, no bruits  CHEST: no chest tenderness  BREAST: deferred   LUNGS: clear to auscultation  CARDIO: RRR without murmur  GI: good BS's, no masses, HSM or tenderness  :deferred  RECTAL: deferred  MUSCULOSKELETAL: back is not tender, FROM of the timo

## 2019-11-26 ENCOUNTER — TELEPHONE (OUTPATIENT)
Dept: OTHER | Age: 54
End: 2019-11-26

## 2019-11-26 NOTE — TELEPHONE ENCOUNTER
Maday Daigle from [facility] care stated the next time the patient has a visit please inform her that [facility] offers a case management program  Please call  if she is interested.     I suggested she call the patient and she stated

## 2019-12-03 PROBLEM — M54.16 LUMBAR RADICULOPATHY: Status: ACTIVE | Noted: 2018-07-27

## 2019-12-04 RX ORDER — SODIUM CHLORIDE, SODIUM LACTATE, POTASSIUM CHLORIDE, CALCIUM CHLORIDE 600; 310; 30; 20 MG/100ML; MG/100ML; MG/100ML; MG/100ML
INJECTION, SOLUTION INTRAVENOUS CONTINUOUS
Status: DISCONTINUED | OUTPATIENT
Start: 2019-12-04 | End: 2019-12-04

## 2019-12-04 RX ORDER — FAMOTIDINE 20 MG/1
20 TABLET ORAL ONCE
Status: DISCONTINUED | OUTPATIENT
Start: 2019-12-04 | End: 2019-12-04

## 2019-12-04 RX ORDER — METOCLOPRAMIDE 10 MG/1
10 TABLET ORAL ONCE
Status: DISCONTINUED | OUTPATIENT
Start: 2019-12-04 | End: 2019-12-04

## 2019-12-04 RX ORDER — ACETAMINOPHEN 500 MG
1000 TABLET ORAL ONCE
Status: DISCONTINUED | OUTPATIENT
Start: 2019-12-04 | End: 2019-12-04

## 2019-12-05 ENCOUNTER — APPOINTMENT (OUTPATIENT)
Dept: LAB | Age: 54
End: 2019-12-05
Attending: FAMILY MEDICINE
Payer: COMMERCIAL

## 2019-12-05 ENCOUNTER — HOSPITAL ENCOUNTER (OUTPATIENT)
Dept: GENERAL RADIOLOGY | Age: 54
Discharge: HOME | End: 2019-12-05
Attending: FAMILY MEDICINE
Payer: COMMERCIAL

## 2019-12-05 ENCOUNTER — LAB ENCOUNTER (OUTPATIENT)
Dept: LAB | Age: 54
End: 2019-12-05
Attending: FAMILY MEDICINE
Payer: COMMERCIAL

## 2019-12-05 DIAGNOSIS — Z01.818 OTHER SPECIFIED PRE-OPERATIVE EXAMINATION: Primary | ICD-10-CM

## 2019-12-05 DIAGNOSIS — M54.10 RADICULOPATHY, UNSPECIFIED SPINAL REGION: ICD-10-CM

## 2019-12-05 PROCEDURE — 85025 COMPLETE CBC W/AUTO DIFF WBC: CPT

## 2019-12-05 PROCEDURE — 81003 URINALYSIS AUTO W/O SCOPE: CPT

## 2019-12-05 PROCEDURE — 93005 ELECTROCARDIOGRAM TRACING: CPT

## 2019-12-05 PROCEDURE — 36415 COLL VENOUS BLD VENIPUNCTURE: CPT

## 2019-12-05 PROCEDURE — 85610 PROTHROMBIN TIME: CPT

## 2019-12-05 PROCEDURE — 71046 X-RAY EXAM CHEST 2 VIEWS: CPT | Performed by: FAMILY MEDICINE

## 2019-12-05 PROCEDURE — 93010 ELECTROCARDIOGRAM REPORT: CPT | Performed by: FAMILY MEDICINE

## 2019-12-05 PROCEDURE — 87086 URINE CULTURE/COLONY COUNT: CPT

## 2019-12-11 ENCOUNTER — HOSPITAL ENCOUNTER (OUTPATIENT)
Facility: HOSPITAL | Age: 54
Discharge: HOME | End: 2019-12-13
Attending: ORTHOPAEDIC SURGERY | Admitting: ORTHOPAEDIC SURGERY
Payer: COMMERCIAL

## 2019-12-11 ENCOUNTER — APPOINTMENT (OUTPATIENT)
Dept: GENERAL RADIOLOGY | Facility: HOSPITAL | Age: 54
End: 2019-12-11
Attending: ORTHOPAEDIC SURGERY
Payer: COMMERCIAL

## 2019-12-11 ENCOUNTER — ANESTHESIA EVENT (OUTPATIENT)
Dept: SURGERY | Facility: HOSPITAL | Age: 54
End: 2019-12-11
Payer: COMMERCIAL

## 2019-12-11 ENCOUNTER — ANESTHESIA (OUTPATIENT)
Dept: SURGERY | Facility: HOSPITAL | Age: 54
End: 2019-12-11
Payer: COMMERCIAL

## 2019-12-11 DIAGNOSIS — M54.17 LEFT LUMBOSACRAL RADICULOPATHY: ICD-10-CM

## 2019-12-11 PROCEDURE — 76000 FLUOROSCOPY <1 HR PHYS/QHP: CPT | Performed by: ORTHOPAEDIC SURGERY

## 2019-12-11 PROCEDURE — 01NB0ZZ RELEASE LUMBAR NERVE, OPEN APPROACH: ICD-10-PCS | Performed by: ORTHOPAEDIC SURGERY

## 2019-12-11 PROCEDURE — 0SP004Z REMOVAL OF INTERNAL FIXATION DEVICE FROM LUMBAR VERTEBRAL JOINT, OPEN APPROACH: ICD-10-PCS | Performed by: ORTHOPAEDIC SURGERY

## 2019-12-11 PROCEDURE — 99214 OFFICE O/P EST MOD 30 MIN: CPT | Performed by: HOSPITALIST

## 2019-12-11 DEVICE — DURAGEN® PLUS DURAL REGENERATION MATRIX, 2 IN X 2 IN (5 CM X 5 CM)
Type: IMPLANTABLE DEVICE | Site: BACK | Status: FUNCTIONAL
Brand: DURAGEN® PLUS

## 2019-12-11 RX ORDER — LIDOCAINE HYDROCHLORIDE AND EPINEPHRINE 10; 10 MG/ML; UG/ML
INJECTION, SOLUTION INFILTRATION; PERINEURAL AS NEEDED
Status: DISCONTINUED | OUTPATIENT
Start: 2019-12-11 | End: 2019-12-11 | Stop reason: HOSPADM

## 2019-12-11 RX ORDER — SODIUM CHLORIDE, SODIUM LACTATE, POTASSIUM CHLORIDE, CALCIUM CHLORIDE 600; 310; 30; 20 MG/100ML; MG/100ML; MG/100ML; MG/100ML
INJECTION, SOLUTION INTRAVENOUS CONTINUOUS
Status: DISCONTINUED | OUTPATIENT
Start: 2019-12-11 | End: 2019-12-11 | Stop reason: HOSPADM

## 2019-12-11 RX ORDER — BACLOFEN 10 MG/1
10 TABLET ORAL EVERY 8 HOURS PRN
Status: DISCONTINUED | OUTPATIENT
Start: 2019-12-11 | End: 2019-12-12

## 2019-12-11 RX ORDER — LEVOTHYROXINE SODIUM 0.1 MG/1
100 TABLET ORAL
Status: DISCONTINUED | OUTPATIENT
Start: 2019-12-12 | End: 2019-12-13

## 2019-12-11 RX ORDER — PROCHLORPERAZINE EDISYLATE 5 MG/ML
5 INJECTION INTRAMUSCULAR; INTRAVENOUS ONCE AS NEEDED
Status: ACTIVE | OUTPATIENT
Start: 2019-12-11 | End: 2019-12-11

## 2019-12-11 RX ORDER — HYDROMORPHONE HYDROCHLORIDE 1 MG/ML
0.8 INJECTION, SOLUTION INTRAMUSCULAR; INTRAVENOUS; SUBCUTANEOUS EVERY 2 HOUR PRN
Status: DISCONTINUED | OUTPATIENT
Start: 2019-12-11 | End: 2019-12-12

## 2019-12-11 RX ORDER — HYDROCODONE BITARTRATE AND ACETAMINOPHEN 5; 325 MG/1; MG/1
1 TABLET ORAL AS NEEDED
Status: DISCONTINUED | OUTPATIENT
Start: 2019-12-11 | End: 2019-12-11 | Stop reason: HOSPADM

## 2019-12-11 RX ORDER — MORPHINE SULFATE 15 MG/1
15 TABLET ORAL EVERY 4 HOURS PRN
Status: DISCONTINUED | OUTPATIENT
Start: 2019-12-11 | End: 2019-12-12

## 2019-12-11 RX ORDER — PREGABALIN 50 MG/1
50 CAPSULE ORAL 2 TIMES DAILY
Status: DISCONTINUED | OUTPATIENT
Start: 2019-12-11 | End: 2019-12-13

## 2019-12-11 RX ORDER — BACITRACIN 50000 [USP'U]/1
INJECTION, POWDER, LYOPHILIZED, FOR SOLUTION INTRAMUSCULAR AS NEEDED
Status: DISCONTINUED | OUTPATIENT
Start: 2019-12-11 | End: 2019-12-11 | Stop reason: HOSPADM

## 2019-12-11 RX ORDER — MORPHINE SULFATE 10 MG/ML
6 INJECTION, SOLUTION INTRAMUSCULAR; INTRAVENOUS EVERY 10 MIN PRN
Status: DISCONTINUED | OUTPATIENT
Start: 2019-12-11 | End: 2019-12-11

## 2019-12-11 RX ORDER — OXYCODONE HYDROCHLORIDE AND ACETAMINOPHEN 5; 325 MG/1; MG/1
2 TABLET ORAL EVERY 4 HOURS PRN
Status: DISCONTINUED | OUTPATIENT
Start: 2019-12-11 | End: 2019-12-12

## 2019-12-11 RX ORDER — OXYCODONE HYDROCHLORIDE AND ACETAMINOPHEN 5; 325 MG/1; MG/1
1-2 TABLET ORAL EVERY 4 HOURS PRN
Status: DISCONTINUED | OUTPATIENT
Start: 2019-12-11 | End: 2019-12-12

## 2019-12-11 RX ORDER — HYDROMORPHONE HYDROCHLORIDE 1 MG/ML
0.2 INJECTION, SOLUTION INTRAMUSCULAR; INTRAVENOUS; SUBCUTANEOUS EVERY 5 MIN PRN
Status: DISCONTINUED | OUTPATIENT
Start: 2019-12-11 | End: 2019-12-11 | Stop reason: HOSPADM

## 2019-12-11 RX ORDER — OXYCODONE HYDROCHLORIDE 5 MG/1
10 TABLET ORAL ONCE
Status: COMPLETED | OUTPATIENT
Start: 2019-12-11 | End: 2019-12-11

## 2019-12-11 RX ORDER — HYDROCODONE BITARTRATE AND ACETAMINOPHEN 5; 325 MG/1; MG/1
2 TABLET ORAL AS NEEDED
Status: DISCONTINUED | OUTPATIENT
Start: 2019-12-11 | End: 2019-12-11 | Stop reason: HOSPADM

## 2019-12-11 RX ORDER — HYDROCODONE BITARTRATE AND ACETAMINOPHEN 5; 325 MG/1; MG/1
2 TABLET ORAL AS NEEDED
Status: DISCONTINUED | OUTPATIENT
Start: 2019-12-11 | End: 2019-12-11

## 2019-12-11 RX ORDER — TIZANIDINE 4 MG/1
4 TABLET ORAL ONCE
Status: COMPLETED | OUTPATIENT
Start: 2019-12-11 | End: 2019-12-11

## 2019-12-11 RX ORDER — DEXAMETHASONE SODIUM PHOSPHATE 10 MG/ML
INJECTION, SOLUTION INTRAMUSCULAR; INTRAVENOUS AS NEEDED
Status: DISCONTINUED | OUTPATIENT
Start: 2019-12-11 | End: 2019-12-11 | Stop reason: HOSPADM

## 2019-12-11 RX ORDER — DIPHENHYDRAMINE HCL 25 MG
25 CAPSULE ORAL EVERY 4 HOURS PRN
Status: DISCONTINUED | OUTPATIENT
Start: 2019-12-11 | End: 2019-12-13

## 2019-12-11 RX ORDER — CEFAZOLIN SODIUM/WATER 2 G/20 ML
2 SYRINGE (ML) INTRAVENOUS EVERY 8 HOURS
Status: COMPLETED | OUTPATIENT
Start: 2019-12-11 | End: 2019-12-12

## 2019-12-11 RX ORDER — MORPHINE SULFATE 4 MG/ML
2 INJECTION, SOLUTION INTRAMUSCULAR; INTRAVENOUS EVERY 10 MIN PRN
Status: DISCONTINUED | OUTPATIENT
Start: 2019-12-11 | End: 2019-12-11 | Stop reason: HOSPADM

## 2019-12-11 RX ORDER — HYDROMORPHONE HYDROCHLORIDE 1 MG/ML
0.4 INJECTION, SOLUTION INTRAMUSCULAR; INTRAVENOUS; SUBCUTANEOUS EVERY 5 MIN PRN
Status: DISCONTINUED | OUTPATIENT
Start: 2019-12-11 | End: 2019-12-11 | Stop reason: HOSPADM

## 2019-12-11 RX ORDER — MORPHINE SULFATE 2 MG/ML
2 INJECTION, SOLUTION INTRAMUSCULAR; INTRAVENOUS EVERY 10 MIN PRN
Status: DISCONTINUED | OUTPATIENT
Start: 2019-12-11 | End: 2019-12-11

## 2019-12-11 RX ORDER — HYDROCODONE BITARTRATE AND ACETAMINOPHEN 5; 325 MG/1; MG/1
1 TABLET ORAL AS NEEDED
Status: DISCONTINUED | OUTPATIENT
Start: 2019-12-11 | End: 2019-12-11

## 2019-12-11 RX ORDER — ONDANSETRON 2 MG/ML
4 INJECTION INTRAMUSCULAR; INTRAVENOUS ONCE AS NEEDED
Status: ACTIVE | OUTPATIENT
Start: 2019-12-11 | End: 2019-12-11

## 2019-12-11 RX ORDER — SCOLOPAMINE TRANSDERMAL SYSTEM 1 MG/1
1 PATCH, EXTENDED RELEASE TRANSDERMAL ONCE
Status: DISCONTINUED | OUTPATIENT
Start: 2019-12-11 | End: 2019-12-14

## 2019-12-11 RX ORDER — DEXAMETHASONE SODIUM PHOSPHATE 10 MG/ML
10 INJECTION, SOLUTION INTRAMUSCULAR; INTRAVENOUS EVERY 12 HOURS PRN
Status: DISCONTINUED | OUTPATIENT
Start: 2019-12-11 | End: 2019-12-13

## 2019-12-11 RX ORDER — HYDROMORPHONE HYDROCHLORIDE 1 MG/ML
0.2 INJECTION, SOLUTION INTRAMUSCULAR; INTRAVENOUS; SUBCUTANEOUS EVERY 5 MIN PRN
Status: DISCONTINUED | OUTPATIENT
Start: 2019-12-11 | End: 2019-12-11

## 2019-12-11 RX ORDER — GABAPENTIN 300 MG/1
900 CAPSULE ORAL ONCE
Status: COMPLETED | OUTPATIENT
Start: 2019-12-11 | End: 2019-12-11

## 2019-12-11 RX ORDER — VANCOMYCIN HYDROCHLORIDE 1 G/20ML
INJECTION, POWDER, LYOPHILIZED, FOR SOLUTION INTRAVENOUS AS NEEDED
Status: DISCONTINUED | OUTPATIENT
Start: 2019-12-11 | End: 2019-12-11 | Stop reason: HOSPADM

## 2019-12-11 RX ORDER — HYDROMORPHONE HYDROCHLORIDE 1 MG/ML
0.4 INJECTION, SOLUTION INTRAMUSCULAR; INTRAVENOUS; SUBCUTANEOUS EVERY 2 HOUR PRN
Status: DISCONTINUED | OUTPATIENT
Start: 2019-12-11 | End: 2019-12-12

## 2019-12-11 RX ORDER — ONDANSETRON 2 MG/ML
4 INJECTION INTRAMUSCULAR; INTRAVENOUS EVERY 4 HOURS PRN
Status: DISCONTINUED | OUTPATIENT
Start: 2019-12-11 | End: 2019-12-12

## 2019-12-11 RX ORDER — CYCLOBENZAPRINE HCL 10 MG
10 TABLET ORAL 3 TIMES DAILY
Status: DISCONTINUED | OUTPATIENT
Start: 2019-12-11 | End: 2019-12-11

## 2019-12-11 RX ORDER — DOCUSATE SODIUM 100 MG/1
100 CAPSULE, LIQUID FILLED ORAL 2 TIMES DAILY
Status: DISCONTINUED | OUTPATIENT
Start: 2019-12-11 | End: 2019-12-13

## 2019-12-11 RX ORDER — METOCLOPRAMIDE 10 MG/1
10 TABLET ORAL ONCE
Status: DISCONTINUED | OUTPATIENT
Start: 2019-12-11 | End: 2019-12-11 | Stop reason: HOSPADM

## 2019-12-11 RX ORDER — NALOXONE HYDROCHLORIDE 0.4 MG/ML
80 INJECTION, SOLUTION INTRAMUSCULAR; INTRAVENOUS; SUBCUTANEOUS AS NEEDED
Status: ACTIVE | OUTPATIENT
Start: 2019-12-11 | End: 2019-12-12

## 2019-12-11 RX ORDER — ROCURONIUM BROMIDE 10 MG/ML
INJECTION, SOLUTION INTRAVENOUS AS NEEDED
Status: DISCONTINUED | OUTPATIENT
Start: 2019-12-11 | End: 2019-12-11 | Stop reason: SURG

## 2019-12-11 RX ORDER — SODIUM CHLORIDE, SODIUM LACTATE, POTASSIUM CHLORIDE, CALCIUM CHLORIDE 600; 310; 30; 20 MG/100ML; MG/100ML; MG/100ML; MG/100ML
INJECTION, SOLUTION INTRAVENOUS CONTINUOUS
Status: DISCONTINUED | OUTPATIENT
Start: 2019-12-11 | End: 2019-12-13

## 2019-12-11 RX ORDER — BISACODYL 10 MG
10 SUPPOSITORY, RECTAL RECTAL
Status: DISCONTINUED | OUTPATIENT
Start: 2019-12-11 | End: 2019-12-13

## 2019-12-11 RX ORDER — ACETAMINOPHEN 500 MG
1000 TABLET ORAL ONCE
Status: COMPLETED | OUTPATIENT
Start: 2019-12-11 | End: 2019-12-11

## 2019-12-11 RX ORDER — SENNOSIDES 8.6 MG
17.2 TABLET ORAL NIGHTLY
Status: DISCONTINUED | OUTPATIENT
Start: 2019-12-11 | End: 2019-12-13

## 2019-12-11 RX ORDER — KETOROLAC TROMETHAMINE 15 MG/ML
15 INJECTION, SOLUTION INTRAMUSCULAR; INTRAVENOUS EVERY 6 HOURS PRN
Status: DISCONTINUED | OUTPATIENT
Start: 2019-12-11 | End: 2019-12-13

## 2019-12-11 RX ORDER — SODIUM PHOSPHATE, DIBASIC AND SODIUM PHOSPHATE, MONOBASIC 7; 19 G/133ML; G/133ML
1 ENEMA RECTAL ONCE AS NEEDED
Status: DISCONTINUED | OUTPATIENT
Start: 2019-12-11 | End: 2019-12-13

## 2019-12-11 RX ORDER — ONDANSETRON 2 MG/ML
4 INJECTION INTRAMUSCULAR; INTRAVENOUS ONCE AS NEEDED
Status: DISCONTINUED | OUTPATIENT
Start: 2019-12-11 | End: 2019-12-11 | Stop reason: HOSPADM

## 2019-12-11 RX ORDER — HALOPERIDOL 5 MG/ML
0.25 INJECTION INTRAMUSCULAR ONCE AS NEEDED
Status: DISCONTINUED | OUTPATIENT
Start: 2019-12-11 | End: 2019-12-11

## 2019-12-11 RX ORDER — HYDROMORPHONE HYDROCHLORIDE 1 MG/ML
0.6 INJECTION, SOLUTION INTRAMUSCULAR; INTRAVENOUS; SUBCUTANEOUS EVERY 5 MIN PRN
Status: DISCONTINUED | OUTPATIENT
Start: 2019-12-11 | End: 2019-12-11

## 2019-12-11 RX ORDER — ACETAMINOPHEN 500 MG
1000 TABLET ORAL ONCE
Status: DISCONTINUED | OUTPATIENT
Start: 2019-12-11 | End: 2019-12-11

## 2019-12-11 RX ORDER — POLYETHYLENE GLYCOL 3350 17 G/17G
17 POWDER, FOR SOLUTION ORAL DAILY PRN
Status: DISCONTINUED | OUTPATIENT
Start: 2019-12-11 | End: 2019-12-13

## 2019-12-11 RX ORDER — METOCLOPRAMIDE HYDROCHLORIDE 5 MG/ML
10 INJECTION INTRAMUSCULAR; INTRAVENOUS EVERY 6 HOURS PRN
Status: DISCONTINUED | OUTPATIENT
Start: 2019-12-11 | End: 2019-12-13

## 2019-12-11 RX ORDER — SODIUM CHLORIDE, SODIUM LACTATE, POTASSIUM CHLORIDE, CALCIUM CHLORIDE 600; 310; 30; 20 MG/100ML; MG/100ML; MG/100ML; MG/100ML
INJECTION, SOLUTION INTRAVENOUS CONTINUOUS PRN
Status: DISCONTINUED | OUTPATIENT
Start: 2019-12-11 | End: 2019-12-11 | Stop reason: SURG

## 2019-12-11 RX ORDER — DIPHENHYDRAMINE HYDROCHLORIDE 50 MG/ML
25 INJECTION INTRAMUSCULAR; INTRAVENOUS EVERY 4 HOURS PRN
Status: DISCONTINUED | OUTPATIENT
Start: 2019-12-11 | End: 2019-12-13

## 2019-12-11 RX ORDER — OXYCODONE HYDROCHLORIDE AND ACETAMINOPHEN 5; 325 MG/1; MG/1
1 TABLET ORAL EVERY 4 HOURS PRN
Status: DISCONTINUED | OUTPATIENT
Start: 2019-12-11 | End: 2019-12-12

## 2019-12-11 RX ORDER — HYDROMORPHONE HYDROCHLORIDE 1 MG/ML
0.4 INJECTION, SOLUTION INTRAMUSCULAR; INTRAVENOUS; SUBCUTANEOUS EVERY 5 MIN PRN
Status: DISCONTINUED | OUTPATIENT
Start: 2019-12-11 | End: 2019-12-11

## 2019-12-11 RX ORDER — HYDROMORPHONE HYDROCHLORIDE 1 MG/ML
INJECTION, SOLUTION INTRAMUSCULAR; INTRAVENOUS; SUBCUTANEOUS AS NEEDED
Status: DISCONTINUED | OUTPATIENT
Start: 2019-12-11 | End: 2019-12-11 | Stop reason: SURG

## 2019-12-11 RX ORDER — HYDROMORPHONE HYDROCHLORIDE 1 MG/ML
0.6 INJECTION, SOLUTION INTRAMUSCULAR; INTRAVENOUS; SUBCUTANEOUS EVERY 5 MIN PRN
Status: DISCONTINUED | OUTPATIENT
Start: 2019-12-11 | End: 2019-12-11 | Stop reason: HOSPADM

## 2019-12-11 RX ORDER — MORPHINE SULFATE 10 MG/ML
6 INJECTION, SOLUTION INTRAMUSCULAR; INTRAVENOUS EVERY 10 MIN PRN
Status: DISCONTINUED | OUTPATIENT
Start: 2019-12-11 | End: 2019-12-11 | Stop reason: HOSPADM

## 2019-12-11 RX ORDER — MORPHINE SULFATE 4 MG/ML
4 INJECTION, SOLUTION INTRAMUSCULAR; INTRAVENOUS EVERY 10 MIN PRN
Status: DISCONTINUED | OUTPATIENT
Start: 2019-12-11 | End: 2019-12-11 | Stop reason: HOSPADM

## 2019-12-11 RX ORDER — EPHEDRINE SULFATE 50 MG/ML
INJECTION, SOLUTION INTRAVENOUS AS NEEDED
Status: DISCONTINUED | OUTPATIENT
Start: 2019-12-11 | End: 2019-12-11 | Stop reason: SURG

## 2019-12-11 RX ORDER — ONDANSETRON 2 MG/ML
INJECTION INTRAMUSCULAR; INTRAVENOUS AS NEEDED
Status: DISCONTINUED | OUTPATIENT
Start: 2019-12-11 | End: 2019-12-11 | Stop reason: SURG

## 2019-12-11 RX ORDER — PROCHLORPERAZINE EDISYLATE 5 MG/ML
5 INJECTION INTRAMUSCULAR; INTRAVENOUS ONCE AS NEEDED
Status: DISCONTINUED | OUTPATIENT
Start: 2019-12-11 | End: 2019-12-11 | Stop reason: HOSPADM

## 2019-12-11 RX ORDER — NALOXONE HYDROCHLORIDE 0.4 MG/ML
80 INJECTION, SOLUTION INTRAMUSCULAR; INTRAVENOUS; SUBCUTANEOUS AS NEEDED
Status: DISCONTINUED | OUTPATIENT
Start: 2019-12-11 | End: 2019-12-11 | Stop reason: HOSPADM

## 2019-12-11 RX ORDER — MIDAZOLAM HYDROCHLORIDE 1 MG/ML
INJECTION INTRAMUSCULAR; INTRAVENOUS AS NEEDED
Status: DISCONTINUED | OUTPATIENT
Start: 2019-12-11 | End: 2019-12-11 | Stop reason: SURG

## 2019-12-11 RX ORDER — FAMOTIDINE 20 MG/1
20 TABLET ORAL ONCE
Status: DISCONTINUED | OUTPATIENT
Start: 2019-12-11 | End: 2019-12-11 | Stop reason: HOSPADM

## 2019-12-11 RX ORDER — CEFAZOLIN SODIUM/WATER 2 G/20 ML
2 SYRINGE (ML) INTRAVENOUS ONCE
Status: COMPLETED | OUTPATIENT
Start: 2019-12-11 | End: 2019-12-11

## 2019-12-11 RX ORDER — DEXAMETHASONE SODIUM PHOSPHATE 4 MG/ML
VIAL (ML) INJECTION AS NEEDED
Status: DISCONTINUED | OUTPATIENT
Start: 2019-12-11 | End: 2019-12-11 | Stop reason: SURG

## 2019-12-11 RX ORDER — MORPHINE SULFATE 4 MG/ML
4 INJECTION, SOLUTION INTRAMUSCULAR; INTRAVENOUS EVERY 10 MIN PRN
Status: DISCONTINUED | OUTPATIENT
Start: 2019-12-11 | End: 2019-12-11

## 2019-12-11 RX ORDER — KETOROLAC TROMETHAMINE 30 MG/ML
INJECTION, SOLUTION INTRAMUSCULAR; INTRAVENOUS AS NEEDED
Status: DISCONTINUED | OUTPATIENT
Start: 2019-12-11 | End: 2019-12-11 | Stop reason: SURG

## 2019-12-11 RX ORDER — BUPIVACAINE HYDROCHLORIDE 5 MG/ML
INJECTION, SOLUTION EPIDURAL; INTRACAUDAL AS NEEDED
Status: DISCONTINUED | OUTPATIENT
Start: 2019-12-11 | End: 2019-12-11 | Stop reason: HOSPADM

## 2019-12-11 RX ORDER — HYDROMORPHONE HYDROCHLORIDE 1 MG/ML
0.2 INJECTION, SOLUTION INTRAMUSCULAR; INTRAVENOUS; SUBCUTANEOUS EVERY 2 HOUR PRN
Status: DISCONTINUED | OUTPATIENT
Start: 2019-12-11 | End: 2019-12-12

## 2019-12-11 RX ORDER — LIDOCAINE HYDROCHLORIDE 20 MG/ML
INJECTION, SOLUTION EPIDURAL; INFILTRATION; INTRACAUDAL; PERINEURAL AS NEEDED
Status: DISCONTINUED | OUTPATIENT
Start: 2019-12-11 | End: 2019-12-11 | Stop reason: SURG

## 2019-12-11 RX ADMIN — HYDROMORPHONE HYDROCHLORIDE 0.5 MG: 1 INJECTION, SOLUTION INTRAMUSCULAR; INTRAVENOUS; SUBCUTANEOUS at 15:18:00

## 2019-12-11 RX ADMIN — SODIUM CHLORIDE, SODIUM LACTATE, POTASSIUM CHLORIDE, CALCIUM CHLORIDE: 600; 310; 30; 20 INJECTION, SOLUTION INTRAVENOUS at 13:40:00

## 2019-12-11 RX ADMIN — CEFAZOLIN SODIUM/WATER 2 G: 2 G/20 ML SYRINGE (ML) INTRAVENOUS at 13:40:00

## 2019-12-11 RX ADMIN — ROCURONIUM BROMIDE 2 MG: 10 INJECTION, SOLUTION INTRAVENOUS at 13:31:00

## 2019-12-11 RX ADMIN — LIDOCAINE HYDROCHLORIDE 80 MG: 20 INJECTION, SOLUTION EPIDURAL; INFILTRATION; INTRACAUDAL; PERINEURAL at 13:31:00

## 2019-12-11 RX ADMIN — EPHEDRINE SULFATE 2.5 MG: 50 INJECTION, SOLUTION INTRAVENOUS at 14:31:00

## 2019-12-11 RX ADMIN — DEXAMETHASONE SODIUM PHOSPHATE 2 MG: 4 MG/ML VIAL (ML) INJECTION at 15:17:00

## 2019-12-11 RX ADMIN — ONDANSETRON 4 MG: 2 INJECTION INTRAMUSCULAR; INTRAVENOUS at 13:31:00

## 2019-12-11 RX ADMIN — EPHEDRINE SULFATE 2.5 MG: 50 INJECTION, SOLUTION INTRAVENOUS at 14:16:00

## 2019-12-11 RX ADMIN — DEXAMETHASONE SODIUM PHOSPHATE 8 MG: 4 MG/ML VIAL (ML) INJECTION at 13:31:00

## 2019-12-11 RX ADMIN — KETOROLAC TROMETHAMINE 30 MG: 30 INJECTION, SOLUTION INTRAMUSCULAR; INTRAVENOUS at 15:16:00

## 2019-12-11 RX ADMIN — MIDAZOLAM HYDROCHLORIDE 2 MG: 1 INJECTION INTRAMUSCULAR; INTRAVENOUS at 13:24:00

## 2019-12-11 NOTE — ANESTHESIA PROCEDURE NOTES
Airway  Date/Time: 12/11/2019 1:39 PM  Urgency: Elective    Airway not difficult    General Information and Staff    Patient location during procedure: OR  Anesthesiologist: Eulis Boast, MD  Resident/CRNA: Lazarus Arce CRNA  Performed: CRNA     I

## 2019-12-11 NOTE — ANESTHESIA PREPROCEDURE EVALUATION
Anesthesia PreOp Note    HPI:     Joe Campbell is a 53year old female who presents for preoperative consultation requested by: Gigi Gr MD    Date of Surgery: 12/11/2019    Procedure(s):  SPINAL HARDWARE REMOVAL  LUMBAR LAMINECTOMY 1 LEVEL headaches         Date Noted: 06/24/2018      High blood pressure         Date Noted: 06/24/2018      Mixed hyperlipidemia         Date Noted: 01/12/2018      Panic anxiety syndrome         Date Noted: 01/12/2018      Facet arthritis of cervical region [facility] HYSTERECTOMY  2013    Supracervical BYRON. Due to excessive bleeding and anemia (per 6/24/18 chart reivew).    • LUMBAR EPIDURAL N/A 11/1/2018    Performed by Kassandra Hernadez MD at [address] [place] [address] N/A 9/20/2018    Performed sodium (ANCEF/KEFZOL) 2 GM/20ML premix IV syringe 2 g, 2 g, Intravenous, Once, Jairo Ambroses, MD    No current [facility]-ordered outpatient medications on file.       No Known Allergies    Family History   Problem Relation Age of Onset   • Blood Disorder Father file        Attends meetings of clubs or organizations: Not on file        Relationship status: Not on file      Intimate partner violence:        Fear of current or ex partner: Not on file        Emotionally abused: Not on file        Physically abused: N Height: 1.549 m (5' 1\")         Anesthesia Evaluation     Patient summary reviewed and Nursing notes reviewed    No history of anesthetic complications   Airway   Mallampati: II  Neck ROM: full  Dental - normal exam     Pulmonary - negative ROS and norm

## 2019-12-11 NOTE — H&P
CC: left thigh pain    HPI: 53year old lady history of L2-L3 lateral interbody fusion and pedicle screw and keron instrumentation. L2-L3 interbody spacer was adjusted post-operatively due to L2 radicular pain. She continues to have pain.  CT scan shows solid drinks      Types: 8 Standard drinks or equivalent per week      Comment: SOCIALLY    Drug use: No    Family History   Problem Relation Age of Onset   • Blood Disorder Father         PVD   • Other (Other) Father         Lupus   • Other (46.80) Father

## 2019-12-11 NOTE — PROGRESS NOTES
[facility]    Progress Note    Pasquale Rear Patient Status:  Outpatient in a Bed    1965 MRN [id number]   Location [address] [facility] Attending Dandy Kincaid MD   Hosp Day # 0 PCP Mihir Fuentes.  Ce Hypothyroidism  CONT HOME MEDS.              Results:     Lab Results   Component Value Date    WBC 4.8 12/05/2019    HGB 13.4 12/05/2019    HCT 40.2 12/05/2019    .0 12/05/2019    CREATSERUM 0.82 11/11/2019    BUN 8 11/11/2019     11/11/2019

## 2019-12-11 NOTE — ANESTHESIA PROCEDURE NOTES
Peripheral IV  Date/Time: 12/11/2019 1:40 PM  Inserted by: Rojelio Reddy CRNA    Placement  Needle size: 20 G  Laterality: left  Location: hand  Site prep: alcohol  Technique: anatomical landmarks  Attempts: 2

## 2019-12-11 NOTE — ANESTHESIA POSTPROCEDURE EVALUATION
Patient: Cristine Gee    Procedure Summary     Date:  12/11/19 Room / Location:  [address] [place] [address] MAIN OR 10 / [facility] MAIN OR    Anesthesia Start:  3044 Anesthesia Stop:  5604    Procedures:       SPINAL HARDWARE REMOVAL (N/A )      LUMBAR LAMINECTOMY 1 LEVEL (

## 2019-12-12 ENCOUNTER — APPOINTMENT (OUTPATIENT)
Dept: GENERAL RADIOLOGY | Facility: HOSPITAL | Age: 54
End: 2019-12-12
Attending: ORTHOPAEDIC SURGERY
Payer: COMMERCIAL

## 2019-12-12 PROCEDURE — 99214 OFFICE O/P EST MOD 30 MIN: CPT | Performed by: HOSPITALIST

## 2019-12-12 PROCEDURE — 72100 X-RAY EXAM L-S SPINE 2/3 VWS: CPT | Performed by: ORTHOPAEDIC SURGERY

## 2019-12-12 RX ORDER — TRAMADOL HYDROCHLORIDE 50 MG/1
50 TABLET ORAL EVERY 6 HOURS PRN
Status: DISCONTINUED | OUTPATIENT
Start: 2019-12-12 | End: 2019-12-13

## 2019-12-12 RX ORDER — BUTALBITAL, ACETAMINOPHEN AND CAFFEINE 50; 325; 40 MG/1; MG/1; MG/1
2 TABLET ORAL EVERY 4 HOURS PRN
Status: DISCONTINUED | OUTPATIENT
Start: 2019-12-12 | End: 2019-12-12

## 2019-12-12 RX ORDER — HYDROCODONE BITARTRATE AND ACETAMINOPHEN 5; 325 MG/1; MG/1
2 TABLET ORAL EVERY 4 HOURS PRN
Status: DISCONTINUED | OUTPATIENT
Start: 2019-12-12 | End: 2019-12-12

## 2019-12-12 RX ORDER — HYDROCODONE BITARTRATE AND ACETAMINOPHEN 5; 325 MG/1; MG/1
1 TABLET ORAL EVERY 4 HOURS PRN
Status: DISCONTINUED | OUTPATIENT
Start: 2019-12-12 | End: 2019-12-12

## 2019-12-12 RX ORDER — ONDANSETRON 2 MG/ML
8 INJECTION INTRAMUSCULAR; INTRAVENOUS EVERY 4 HOURS PRN
Status: ACTIVE | OUTPATIENT
Start: 2019-12-12 | End: 2019-12-12

## 2019-12-12 RX ORDER — BUTALBITAL, ACETAMINOPHEN AND CAFFEINE 50; 325; 40 MG/1; MG/1; MG/1
1 TABLET ORAL EVERY 4 HOURS PRN
Status: DISCONTINUED | OUTPATIENT
Start: 2019-12-12 | End: 2019-12-12

## 2019-12-12 RX ORDER — GABAPENTIN 100 MG/1
100 CAPSULE ORAL 3 TIMES DAILY
Status: DISCONTINUED | OUTPATIENT
Start: 2019-12-12 | End: 2019-12-12

## 2019-12-12 RX ORDER — BACLOFEN 10 MG/1
10 TABLET ORAL 3 TIMES DAILY
Status: DISCONTINUED | OUTPATIENT
Start: 2019-12-12 | End: 2019-12-13

## 2019-12-12 RX ORDER — MAGNESIUM SULFATE HEPTAHYDRATE 40 MG/ML
2 INJECTION, SOLUTION INTRAVENOUS ONCE
Status: COMPLETED | OUTPATIENT
Start: 2019-12-12 | End: 2019-12-13

## 2019-12-12 NOTE — OPERATIVE REPORT
[facility]    PATIENT'S NAME: Davin Hayes ARIE   ATTENDING PHYSICIAN: Bertrand Borrego MD   OPERATING PHYSICIAN: Bo Lockhart.  Clarice Hopkins MD   PATIENT ACCOUNT#:   [id number]    LOCATION:  [facility] PACU 5 [facility] 10  MEDICAL RECORD #:   [id number]       D symptoms. Careful visualization of the MRI shows possible protrusion within the L2 foramen affecting the exiting L2 nerve root. Symptoms are highly suggestive of L2 radiculopathy. Pain is severe.   Solid fusion has been confirmed on CT scan as well as sa diameter and 6 cm in length was established and secured to the operating room table. The microscope was carefully positioned. Residual muscle fibers were removed from the left-sided lamina, the left pars, as well as the left L2-L3 facet joint.   This was microporous tape. All needle and sponge counts were correct. No complications were noted during the procedure. No adverse events on intraoperative neuromonitoring. Patient was transferred to the [facility] in supine position.   She was revived, ext

## 2019-12-12 NOTE — PHYSICAL THERAPY NOTE
Attempted to see patient for physical therapy evaluation twice today. Patient declined twice due to migraine. Will attempt to see patient tomorrow for physical therapy evaluation. RN aware and agreeable.

## 2019-12-12 NOTE — PLAN OF CARE
Problem: PAIN - ADULT  Goal: Verbalizes/displays adequate comfort level or patient's stated pain goal  Description  INTERVENTIONS:  - Encourage pt to monitor pain and request assistance  - Assess pain using appropriate pain scale  - Administer analgesics patient's ability to be responsible for managing their own health  - Refer to Case Management Department for coordinating discharge planning if the patient needs post-hospital services based on physician/LIP order or complex needs related to functional sta mobility and gait  - Educate and engage patient/family in tolerated activity level and precautions  Outcome: Progressing       Pt has a migraine this morning, Dr. Maral Hunt made aware and Fioricet was given.  No relief after an hour, Dr. Maral Hunt was notifie

## 2019-12-12 NOTE — PROGRESS NOTES
[facility] - [facility]    Progress Note    Minoo Cubaas Patient Status:  Outpatient in a Bed    1965 MRN [id number]   Location [facility] 4W/SW/SE Attending Jenna Simmons Day # 0 PCP Chaim Khan.  DO Bakari spent coordinating care with nurse and counseling pt about headache plan               Results:     Lab Results   Component Value Date    WBC 16.1 (H) 12/12/2019    HGB 12.0 12/12/2019    HCT 36.9 12/12/2019    .0 12/12/2019    CREATSERUM 0.82 12/12

## 2019-12-12 NOTE — PLAN OF CARE
Problem: Patient Centered Care  Goal: Patient preferences are identified and integrated in the patient's plan of care  Description  Interventions:  - What would you like us to know as we care for you?  - Provide timely, complete, and accurate information [place] fall precautions as indicated by assessment.  - Educate pt/family on patient safety including physical limitations  - Instruct pt to call for assistance with activity based on assessment  - Modify environment to reduce risk of injury  - Provide a

## 2019-12-13 VITALS
RESPIRATION RATE: 18 BRPM | TEMPERATURE: 98 F | SYSTOLIC BLOOD PRESSURE: 125 MMHG | DIASTOLIC BLOOD PRESSURE: 72 MMHG | HEIGHT: 61 IN | WEIGHT: 138.19 LBS | HEART RATE: 61 BPM | OXYGEN SATURATION: 94 % | BODY MASS INDEX: 26.09 KG/M2

## 2019-12-13 PROCEDURE — 99214 OFFICE O/P EST MOD 30 MIN: CPT | Performed by: HOSPITALIST

## 2019-12-13 RX ORDER — BUTALBITAL, ACETAMINOPHEN AND CAFFEINE 50; 325; 40 MG/1; MG/1; MG/1
1-2 TABLET ORAL EVERY 4 HOURS PRN
Qty: 40 TABLET | Refills: 0 | Status: SHIPPED | OUTPATIENT
Start: 2019-12-13 | End: 2020-07-06

## 2019-12-13 RX ORDER — HYDROCODONE BITARTRATE AND ACETAMINOPHEN 7.5; 325 MG/1; MG/1
1 TABLET ORAL EVERY 6 HOURS PRN
Qty: 40 TABLET | Refills: 0 | Status: SHIPPED | OUTPATIENT
Start: 2019-12-13 | End: 2019-12-13

## 2019-12-13 RX ORDER — BACLOFEN 10 MG/1
10 TABLET ORAL EVERY 8 HOURS PRN
Qty: 90 TABLET | Refills: 1 | Status: SHIPPED | OUTPATIENT
Start: 2019-12-13 | End: 2020-01-21

## 2019-12-13 RX ORDER — BACLOFEN 10 MG/1
10 TABLET ORAL EVERY 8 HOURS PRN
Qty: 90 TABLET | Refills: 1 | Status: SHIPPED | OUTPATIENT
Start: 2019-12-13 | End: 2019-12-13

## 2019-12-13 RX ORDER — PSEUDOEPHEDRINE HCL 30 MG
100 TABLET ORAL 2 TIMES DAILY PRN
Qty: 30 CAPSULE | Refills: 0 | Status: SHIPPED | OUTPATIENT
Start: 2019-12-13 | End: 2020-11-23

## 2019-12-13 RX ORDER — TRAMADOL HYDROCHLORIDE 50 MG/1
50 TABLET ORAL EVERY 6 HOURS PRN
Qty: 60 TABLET | Refills: 0 | Status: SHIPPED | OUTPATIENT
Start: 2019-12-13 | End: 2020-01-17

## 2019-12-13 RX ORDER — IBUPROFEN 200 MG
TABLET ORAL EVERY 8 HOURS PRN
Status: SHIPPED | COMMUNITY
Start: 2019-12-13 | End: 2020-11-23

## 2019-12-13 NOTE — PROGRESS NOTES
S: headache this AM. There might be slight improvement in thigh pain present pre-op. Headache is not positional, it is constant. Last night she went to bathroom and was standing without the headache.     O: 98.3 121/72 62  Sensation to light touch intact ri

## 2019-12-13 NOTE — OCCUPATIONAL THERAPY NOTE
OCCUPATIONAL THERAPY EVALUATION - INPATIENT      Room Number: 434/434-A  Evaluation Date: 12/13/2019  Type of Evaluation: Initial  Presenting Problem: (L L2-L3  removal of hardware & facectomy)    Physician Order: Eleanor Bender to Occupational Therapy  Reason surgeon, but also not to climb up on the counter top even if she feels healed. Pt reports she has a hard time asking for help and likes to go things herself. Pt was provided with alternatives for homemaking tasks.  Pt was educated on following surgeon's pr excessive bleeding and anemia (per 6/24/18 chart reivew).    • LUMBAR EPIDURAL N/A 11/1/2018    Performed by Michelle Valdes MD at [address] [facility] 9/20/2018    Performed by Michelle Valdes MD at [address] [facility] Commands:  follows one step commands consistently  Safety Judgement:  decreased awareness of need for assistance, decreased awareness of need for safety and Please see \"assessment\" section noted above     RANGE OF MOTION   Upper extremity ROM is within f [place], OTR/L 12/13/2019

## 2019-12-13 NOTE — PLAN OF CARE
Patient up independently. Cms is intact. Pain controled. RA. Plan to go home today pending clearance.    Problem: Patient Centered Care  Goal: Patient preferences are identified and integrated in the patient's plan of care  Description  Interventions:  - Wh frequently for physical needs  - Identify cognitive and physical deficits and behaviors that affect risk of falls.   - [place] fall precautions as indicated by assessment.  - Educate pt/family on patient safety including physical limitations  - Instruct p patient/family  Outcome: Progressing  Goal: Maintain proper alignment of affected body part  Description  INTERVENTIONS:  - Support and protect limb and body alignment per provider's orders  - Instruct and reinforce with patient and family use of appropria

## 2019-12-13 NOTE — PLAN OF CARE
Problem: Patient Centered Care  Goal: Patient preferences are identified and integrated in the patient's plan of care  Description  Interventions:  - What would you like us to know as we care for you? THIS IS MY 3RD BACK SURGERY.   - Provide timely, compl from fall injury  Description  INTERVENTIONS:  - Assess pt frequently for physical needs  - Identify cognitive and physical deficits and behaviors that affect risk of falls.   - [place] fall precautions as indicated by assessment.  - Educate pt/family on bundle as indicated  Outcome: Progressing     Problem: MUSCULOSKELETAL - ADULT  Goal: Return mobility to safest level of function  Description  INTERVENTIONS:  - Assess patient stability and activity tolerance for standing, transferring and ambulating w/ o PATIENT HAS SCD ORDER BUT REFUSING TO WEAR AT THIS TIME. PATIENT EDUCATED ON IMPORTANCE OF WEARING THE SCD AND EXPRESSED UNDERSTANDING. PATIENT IS GETTING UP WITH STANDBY ASSIST AND WALKER. PATIENT WALKED AROUND NURSES STATIONS WITH .  PATIENT Teresita Smith

## 2019-12-13 NOTE — DISCHARGE SUMMARY
Dc  Summary#[id number]  > 30 min spent on [address] Discharge Diagnoses: spinal surgery    Lace+ Score: 17  59-90 High Risk  29-58 Medium Risk  0-28   Low Risk.     TCM Follow-Up Recommendation:  LACE < 29: Low Risk of readmission after discharge from

## 2019-12-13 NOTE — PHYSICAL THERAPY NOTE
PHYSICAL THERAPY  EVALUATION - INPATIENT    Room Number: 434/434-A  Evaluation Date: 12/13/2019  Presenting Problem: left L2 nerve root decompression, hardware removal 12/11  Physician Order: PT Eval and Treat    Problem List  Principal Problem:    Left SURGERY PROCEDURE UNLISTED  04/2019    fusion revision   • TONSILLECTOMY  1970   • TUBAL LIGATION  2005       HOME SITUATION  Type of Home: House   Home Layout: One level  Stairs to Enter : 4     Stairs to [employer]: 0       Lives With: Spouse  Drives:  Yes 400  Assistive Device: None  Pattern: Within Functional Limits  Stoop/Curb Assistance: Supervision  Comment : 4 steps one rail step to    Skilled Therapy Provided: Chart reviewed. RN aware. Patient up in chair, uncomfortable with sitting.   Able to transf

## 2019-12-13 NOTE — PROGRESS NOTES
S: headache improved from yesterday.  Left thigh hyperesthesia may also be improved; she can cover the thigh today with blankets    O:    12/13/19  0500   BP: 125/72   Pulse: 61   Resp: 18   Temp: 98.4 °F (36.9 °C)     Sensation to light touch and motor str

## 2019-12-15 NOTE — DISCHARGE SUMMARY
[facility]    PATIENT'S NAME: Maura SARGENT   ATTENDING PHYSICIAN: Char Simmons MD   PATIENT ACCOUNT#:   [id number]    LOCATION:  [address] [place] [address] [address] RECORD #:   [id number]       YOB: 1965  ADMISSION DATE: recipe of IV magnesium and lidocaine to the back of her neck, starting to pretreat her with magnesium before an operation, perhaps using Ultram or Toradol and NSAIDs if possible for pain and baclofen.   I did not reach the level of needing Depakote, triptan

## 2019-12-17 ENCOUNTER — TELEPHONE (OUTPATIENT)
Dept: INTERNAL MEDICINE UNIT | Facility: HOSPITAL | Age: 54
End: 2019-12-17

## 2019-12-17 NOTE — TELEPHONE ENCOUNTER
Pt discharged from [facility] on 12/13/19 with instruction for PCP. VM left to please call to schedule follow up with Primary Care Physician.

## 2019-12-18 ENCOUNTER — PATIENT MESSAGE (OUTPATIENT)
Dept: FAMILY MEDICINE CLINIC | Facility: CLINIC | Age: 54
End: 2019-12-18

## 2019-12-18 RX ORDER — CLONAZEPAM 1 MG/1
TABLET ORAL
Qty: 30 TABLET | Refills: 0 | OUTPATIENT
Start: 2019-12-18

## 2019-12-18 NOTE — TELEPHONE ENCOUNTER
From: Mars Powell  To: Heidi Villegas DO  Sent: 12/18/2019 4:04 PM CST  Subject: Prescription Question    Hi. Surgery was rough. Another migraine from a nurse giving me two Percocets. I never had one Percocet in my life, but got through it.

## 2019-12-20 PROBLEM — Z98.1 ARTHRODESIS STATUS: Status: ACTIVE | Noted: 2019-12-20

## 2019-12-25 NOTE — TELEPHONE ENCOUNTER
Controlled medication pending for review. Please change to phone in, fax, or print script if not being sent electronically.     Last Rx: 9-12-19 # 30  LOV: 11-14-19    Requested Prescriptions     Pending Prescriptions Disp Refills   • CLONAZEPAM 1 MG Oral

## 2019-12-26 RX ORDER — CLONAZEPAM 1 MG/1
TABLET ORAL
Qty: 30 TABLET | Refills: 0 | Status: SHIPPED | OUTPATIENT
Start: 2019-12-26 | End: 2020-03-05

## 2020-01-21 PROBLEM — M54.17 LUMBOSACRAL RADICULOPATHY AT L2: Status: ACTIVE | Noted: 2020-01-21

## 2020-03-04 ENCOUNTER — OFFICE VISIT (OUTPATIENT)
Dept: FAMILY MEDICINE CLINIC | Facility: CLINIC | Age: 55
End: 2020-03-04
Payer: COMMERCIAL

## 2020-03-04 VITALS
BODY MASS INDEX: 24.55 KG/M2 | HEIGHT: 61 IN | HEART RATE: 86 BPM | TEMPERATURE: 98 F | SYSTOLIC BLOOD PRESSURE: 119 MMHG | WEIGHT: 130 LBS | DIASTOLIC BLOOD PRESSURE: 70 MMHG

## 2020-03-04 DIAGNOSIS — G47.09 OTHER INSOMNIA: ICD-10-CM

## 2020-03-04 DIAGNOSIS — H93.13 TINNITUS OF BOTH EARS: Primary | ICD-10-CM

## 2020-03-04 DIAGNOSIS — G89.29 OTHER CHRONIC PAIN: ICD-10-CM

## 2020-03-04 PROCEDURE — 99212 OFFICE O/P EST SF 10 MIN: CPT | Performed by: FAMILY MEDICINE

## 2020-03-04 PROCEDURE — 99213 OFFICE O/P EST LOW 20 MIN: CPT | Performed by: FAMILY MEDICINE

## 2020-03-04 RX ORDER — FLUTICASONE PROPIONATE 50 MCG
2 SPRAY, SUSPENSION (ML) NASAL DAILY
Qty: 1 BOTTLE | Refills: 3 | Status: SHIPPED | OUTPATIENT
Start: 2020-03-04 | End: 2020-10-05

## 2020-03-04 RX ORDER — LEVOTHYROXINE SODIUM 0.1 MG/1
TABLET ORAL
Qty: 90 TABLET | Refills: 1 | Status: SHIPPED | OUTPATIENT
Start: 2020-03-04 | End: 2020-08-27

## 2020-03-04 NOTE — PATIENT INSTRUCTIONS
Treating Insomnia     Learning to relax before bedtime can improve your sleep. Good sleeping habits are a key part of treatment.  If needed, some medicines may help you sleep better at first. Making healthy lifestyle changes and learning to relax can im Stress, anxiety, and body tension may keep you awake at night. To unwind before bedtime, try a warm bath, meditation, or yoga. Also try the following:  · Deep breathing. Sit or lie back in a chair. Take a slow, deep breath. Hold it for 5 counts.  Then breat Identifying and removing the cause is the best way to treat tinnitus. For that reason, your healthcare provider may refer you to an otolaryngologist (ear, nose, and throat doctor). Your hearing may also be checked by an audiologist (hearing specialist).  If

## 2020-03-04 NOTE — PROGRESS NOTES
Blood pressure 119/70, pulse 86, temperature 97.6 °F (36.4 °C), height 5' 1\" (1.549 m), weight 130 lb (59 kg), last menstrual period 10/17/2008. Patient presents today complaining of tinnitus. She drinks coffee daily. Also insomnia.   She continues ha

## 2020-03-04 NOTE — TELEPHONE ENCOUNTER
Patient is requesting for Zolpidem or Quetiapine for occasion insomnia. Please advise and call patient. Thank you.

## 2020-03-05 ENCOUNTER — TELEPHONE (OUTPATIENT)
Dept: PAIN CLINIC | Facility: HOSPITAL | Age: 55
End: 2020-03-05

## 2020-03-05 NOTE — TELEPHONE ENCOUNTER
•  CLONAZEPAM 1 MG Oral Tab, TAKE 1 TABLET BY MOUTH EVERY MORNING AS NEEDED FOR ANXIETY, Disp: 30 tablet, Rfl: 0   NEEDED FOR SLEEP, Disp: 30 tablet, Rfl: 2

## 2020-03-06 RX ORDER — ZOLPIDEM TARTRATE 10 MG/1
TABLET ORAL
Qty: 30 TABLET | Refills: 0 | Status: SHIPPED | OUTPATIENT
Start: 2020-03-06 | End: 2020-07-06

## 2020-03-06 NOTE — TELEPHONE ENCOUNTER
Controlled medication pending for review. Please change to phone in, fax, or print script if not being sent electronically.     Last Rx: 9/30/19  LOV: 3/5/20    Requested Prescriptions   Pending Prescriptions Disp Refills   • Zolpidem Tartrate 10 MG Oral T

## 2020-03-07 RX ORDER — ZOLPIDEM TARTRATE 10 MG/1
TABLET ORAL
Qty: 30 TABLET | Refills: 0
Start: 2020-03-07

## 2020-03-07 RX ORDER — CLONAZEPAM 1 MG/1
TABLET ORAL
Qty: 30 TABLET | Refills: 0 | Status: SHIPPED | OUTPATIENT
Start: 2020-03-07

## 2020-03-07 NOTE — TELEPHONE ENCOUNTER
Controlled medication pending for review. Please change to phone in, fax, or print script if not being sent electronically.     Last Rx: 12/26/19  LOV: 3/4/20    Requested Prescriptions   Pending Prescriptions Disp Refills   • clonazePAM 1 MG Oral Tab 30 t

## 2020-05-14 ENCOUNTER — E-VISIT (OUTPATIENT)
Dept: FAMILY MEDICINE CLINIC | Facility: CLINIC | Age: 55
End: 2020-05-14

## 2020-05-14 DIAGNOSIS — R30.0 DYSURIA: Primary | ICD-10-CM

## 2020-05-14 PROCEDURE — 99421 OL DIG E/M SVC 5-10 MIN: CPT | Performed by: NURSE PRACTITIONER

## 2020-05-14 RX ORDER — NITROFURANTOIN 25; 75 MG/1; MG/1
100 CAPSULE ORAL 2 TIMES DAILY
Qty: 14 CAPSULE | Refills: 0 | Status: SHIPPED | OUTPATIENT
Start: 2020-05-14 | End: 2020-05-21

## 2020-05-14 NOTE — PROGRESS NOTES
Patient subscribed to an E-Visit. After review of history and physical and symptoms 6 minutes were dedicated to this E-Visit. Please review E-Visit for further information.

## 2020-06-30 ENCOUNTER — TELEPHONE (OUTPATIENT)
Dept: PHYSICAL THERAPY | Age: 55
End: 2020-06-30

## 2020-07-02 ENCOUNTER — APPOINTMENT (OUTPATIENT)
Dept: PHYSICAL THERAPY | Age: 55
End: 2020-07-02
Attending: FAMILY MEDICINE
Payer: COMMERCIAL

## 2020-07-06 ENCOUNTER — OFFICE VISIT (OUTPATIENT)
Dept: FAMILY MEDICINE CLINIC | Facility: CLINIC | Age: 55
End: 2020-07-06
Payer: COMMERCIAL

## 2020-07-06 ENCOUNTER — APPOINTMENT (OUTPATIENT)
Dept: PHYSICAL THERAPY | Age: 55
End: 2020-07-06
Attending: FAMILY MEDICINE
Payer: COMMERCIAL

## 2020-07-06 VITALS
HEIGHT: 61 IN | HEART RATE: 72 BPM | SYSTOLIC BLOOD PRESSURE: 97 MMHG | DIASTOLIC BLOOD PRESSURE: 62 MMHG | BODY MASS INDEX: 24.8 KG/M2 | WEIGHT: 131.38 LBS

## 2020-07-06 DIAGNOSIS — M96.1 POSTLAMINECTOMY SYNDROME OF LUMBAR REGION: Primary | ICD-10-CM

## 2020-07-06 DIAGNOSIS — Z12.11 ENCOUNTER FOR SCREENING COLONOSCOPY: ICD-10-CM

## 2020-07-06 DIAGNOSIS — Z12.31 SCREENING MAMMOGRAM, ENCOUNTER FOR: ICD-10-CM

## 2020-07-06 DIAGNOSIS — G47.09 OTHER INSOMNIA: ICD-10-CM

## 2020-07-06 PROCEDURE — 99213 OFFICE O/P EST LOW 20 MIN: CPT | Performed by: FAMILY MEDICINE

## 2020-07-06 PROCEDURE — 99212 OFFICE O/P EST SF 10 MIN: CPT | Performed by: FAMILY MEDICINE

## 2020-07-06 RX ORDER — ZOLPIDEM TARTRATE 10 MG/1
TABLET ORAL
Qty: 30 TABLET | Refills: 0 | Status: SHIPPED | OUTPATIENT
Start: 2020-07-06 | End: 2020-08-14

## 2020-07-06 RX ORDER — CEFADROXIL 500 MG/1
CAPSULE ORAL
COMMUNITY
Start: 2020-06-01 | End: 2020-11-23

## 2020-07-06 RX ORDER — METHOCARBAMOL 500 MG/1
TABLET, FILM COATED ORAL
COMMUNITY
Start: 2020-04-20 | End: 2020-11-23

## 2020-07-06 RX ORDER — BACLOFEN 10 MG/1
TABLET ORAL
COMMUNITY
Start: 2020-06-02 | End: 2020-07-06

## 2020-07-06 RX ORDER — LEVOTHYROXINE SODIUM 0.1 MG/1
TABLET ORAL
COMMUNITY
Start: 2020-05-27 | End: 2020-11-23

## 2020-07-06 NOTE — PROGRESS NOTES
Blood pressure 97/62, pulse 72, height 5' 1\" (1.549 m), weight 131 lb 6 oz (59.6 kg), last menstrual period 10/17/2008. Patient presents today for preoperative visit for a spinal column stimulator.   She continues to have severe pain in her leg and is n

## 2020-07-09 ENCOUNTER — APPOINTMENT (OUTPATIENT)
Dept: PHYSICAL THERAPY | Age: 55
End: 2020-07-09
Attending: FAMILY MEDICINE
Payer: COMMERCIAL

## 2020-07-11 ENCOUNTER — NURSE TRIAGE (OUTPATIENT)
Dept: FAMILY MEDICINE CLINIC | Facility: CLINIC | Age: 55
End: 2020-07-11

## 2020-07-11 NOTE — TELEPHONE ENCOUNTER
Action Requested: Summary for Provider     []  Critical Lab, Recommendations Needed  [x] Need Additional Advice  []   FYI    []   Need Orders  [] Need Medications Sent to Pharmacy  []  Other     SUMMARY:  Spoke with pt,  verified, pt stated she is michael

## 2020-07-13 ENCOUNTER — APPOINTMENT (OUTPATIENT)
Dept: PHYSICAL THERAPY | Age: 55
End: 2020-07-13
Attending: FAMILY MEDICINE
Payer: COMMERCIAL

## 2020-07-15 ENCOUNTER — EKG ENCOUNTER (OUTPATIENT)
Dept: LAB | Age: 55
End: 2020-07-15
Attending: FAMILY MEDICINE
Payer: COMMERCIAL

## 2020-07-15 ENCOUNTER — LAB ENCOUNTER (OUTPATIENT)
Dept: LAB | Age: 55
End: 2020-07-15
Attending: FAMILY MEDICINE
Payer: COMMERCIAL

## 2020-07-15 DIAGNOSIS — M96.1 POSTLAMINECTOMY SYNDROME, CERVICAL REGION: Primary | ICD-10-CM

## 2020-07-15 DIAGNOSIS — M96.1 LUMBAR POST-LAMINECTOMY SYNDROME: ICD-10-CM

## 2020-07-15 LAB
ANION GAP SERPL CALC-SCNC: 4 MMOL/L (ref 0–18)
BASOPHILS # BLD AUTO: 0.02 X10(3) UL (ref 0–0.2)
BASOPHILS NFR BLD AUTO: 0.5 %
BUN BLD-MCNC: 9 MG/DL (ref 7–18)
BUN/CREAT SERPL: 10.2 (ref 10–20)
CALCIUM BLD-MCNC: 9 MG/DL (ref 8.5–10.1)
CHLORIDE SERPL-SCNC: 107 MMOL/L (ref 98–112)
CO2 SERPL-SCNC: 28 MMOL/L (ref 21–32)
CREAT BLD-MCNC: 0.88 MG/DL (ref 0.55–1.02)
DEPRECATED RDW RBC AUTO: 41.1 FL (ref 35.1–46.3)
EOSINOPHIL # BLD AUTO: 0.1 X10(3) UL (ref 0–0.7)
EOSINOPHIL NFR BLD AUTO: 2.4 %
ERYTHROCYTE [DISTWIDTH] IN BLOOD BY AUTOMATED COUNT: 12.2 % (ref 11–15)
GLUCOSE BLD-MCNC: 119 MG/DL (ref 70–99)
HCT VFR BLD AUTO: 40.5 % (ref 35–48)
HGB BLD-MCNC: 13.3 G/DL (ref 12–16)
IMM GRANULOCYTES # BLD AUTO: 0.01 X10(3) UL (ref 0–1)
IMM GRANULOCYTES NFR BLD: 0.2 %
LYMPHOCYTES # BLD AUTO: 0.97 X10(3) UL (ref 1–4)
LYMPHOCYTES NFR BLD AUTO: 23.4 %
MCH RBC QN AUTO: 30.2 PG (ref 26–34)
MCHC RBC AUTO-ENTMCNC: 32.8 G/DL (ref 31–37)
MCV RBC AUTO: 91.8 FL (ref 80–100)
MONOCYTES # BLD AUTO: 0.36 X10(3) UL (ref 0.1–1)
MONOCYTES NFR BLD AUTO: 8.7 %
NEUTROPHILS # BLD AUTO: 2.68 X10 (3) UL (ref 1.5–7.7)
NEUTROPHILS # BLD AUTO: 2.68 X10(3) UL (ref 1.5–7.7)
NEUTROPHILS NFR BLD AUTO: 64.8 %
OSMOLALITY SERPL CALC.SUM OF ELEC: 288 MOSM/KG (ref 275–295)
PATIENT FASTING Y/N/NP: NO
PLATELET # BLD AUTO: 176 10(3)UL (ref 150–450)
POTASSIUM SERPL-SCNC: 3.8 MMOL/L (ref 3.5–5.1)
RBC # BLD AUTO: 4.41 X10(6)UL (ref 3.8–5.3)
SODIUM SERPL-SCNC: 139 MMOL/L (ref 136–145)
WBC # BLD AUTO: 4.1 X10(3) UL (ref 4–11)

## 2020-07-15 PROCEDURE — 93010 ELECTROCARDIOGRAM REPORT: CPT | Performed by: FAMILY MEDICINE

## 2020-07-15 PROCEDURE — 93005 ELECTROCARDIOGRAM TRACING: CPT

## 2020-07-15 PROCEDURE — 80048 BASIC METABOLIC PNL TOTAL CA: CPT

## 2020-07-15 PROCEDURE — 36415 COLL VENOUS BLD VENIPUNCTURE: CPT

## 2020-07-15 PROCEDURE — 85025 COMPLETE CBC W/AUTO DIFF WBC: CPT

## 2020-07-16 ENCOUNTER — TELEPHONE (OUTPATIENT)
Dept: FAMILY MEDICINE CLINIC | Facility: CLINIC | Age: 55
End: 2020-07-16

## 2020-07-16 NOTE — TELEPHONE ENCOUNTER
----- Message from Corine Lau DO sent at 7/15/2020  9:17 PM CDT -----  Labs Tiffany Hopkins reviewed patient cleared for procedure. Please notify surgeon.

## 2020-07-16 NOTE — TELEPHONE ENCOUNTER
Pre-op clearance notes/labs/ekg faxed to Dr. Live Ryan office.     Per patient, faxed to [phone] attn Diana/Abigail

## 2020-07-20 NOTE — TELEPHONE ENCOUNTER
Per Seema Byrd from [facility] @[phone] need to fax the H&P; Labs; EKG with tracing to [phone]. Surgery is on 07/23/2020.

## 2020-07-21 ENCOUNTER — APPOINTMENT (OUTPATIENT)
Dept: PHYSICAL THERAPY | Age: 55
End: 2020-07-21
Attending: FAMILY MEDICINE
Payer: COMMERCIAL

## 2020-07-23 ENCOUNTER — APPOINTMENT (OUTPATIENT)
Dept: PHYSICAL THERAPY | Age: 55
End: 2020-07-23
Attending: FAMILY MEDICINE
Payer: COMMERCIAL

## 2020-07-28 ENCOUNTER — APPOINTMENT (OUTPATIENT)
Dept: PHYSICAL THERAPY | Age: 55
End: 2020-07-28
Attending: FAMILY MEDICINE
Payer: COMMERCIAL

## 2020-07-30 ENCOUNTER — APPOINTMENT (OUTPATIENT)
Dept: PHYSICAL THERAPY | Age: 55
End: 2020-07-30
Attending: FAMILY MEDICINE
Payer: COMMERCIAL

## 2020-08-04 ENCOUNTER — APPOINTMENT (OUTPATIENT)
Dept: PHYSICAL THERAPY | Age: 55
End: 2020-08-04
Attending: FAMILY MEDICINE
Payer: COMMERCIAL

## 2020-08-06 ENCOUNTER — APPOINTMENT (OUTPATIENT)
Dept: PHYSICAL THERAPY | Age: 55
End: 2020-08-06
Attending: FAMILY MEDICINE
Payer: COMMERCIAL

## 2020-08-11 ENCOUNTER — APPOINTMENT (OUTPATIENT)
Dept: PHYSICAL THERAPY | Age: 55
End: 2020-08-11
Attending: FAMILY MEDICINE
Payer: COMMERCIAL

## 2020-08-13 ENCOUNTER — APPOINTMENT (OUTPATIENT)
Dept: PHYSICAL THERAPY | Age: 55
End: 2020-08-13
Attending: FAMILY MEDICINE
Payer: COMMERCIAL

## 2020-08-18 ENCOUNTER — APPOINTMENT (OUTPATIENT)
Dept: PHYSICAL THERAPY | Age: 55
End: 2020-08-18
Attending: FAMILY MEDICINE
Payer: COMMERCIAL

## 2020-08-20 ENCOUNTER — APPOINTMENT (OUTPATIENT)
Dept: PHYSICAL THERAPY | Age: 55
End: 2020-08-20
Attending: FAMILY MEDICINE
Payer: COMMERCIAL

## 2020-08-21 ENCOUNTER — TELEPHONE (OUTPATIENT)
Dept: FAMILY MEDICINE CLINIC | Facility: CLINIC | Age: 55
End: 2020-08-21

## 2020-08-27 RX ORDER — LEVOTHYROXINE SODIUM 0.1 MG/1
100 TABLET ORAL DAILY
Qty: 90 TABLET | Refills: 1 | Status: SHIPPED | OUTPATIENT
Start: 2020-08-27 | End: 2020-11-24

## 2020-08-27 NOTE — TELEPHONE ENCOUNTER
Patient is requesting a refill on medication patient has been out for two days. pharmacy  never sent request over.         Please advise      Levothyroxine Sodium 100 MCG Oral Tab   5/27/2020    Route:   (none)

## 2020-10-05 RX ORDER — FLUTICASONE PROPIONATE 50 MCG
2 SPRAY, SUSPENSION (ML) NASAL DAILY
Qty: 1 BOTTLE | Refills: 3 | Status: SHIPPED | OUTPATIENT
Start: 2020-10-05 | End: 2021-02-01

## 2020-10-05 NOTE — TELEPHONE ENCOUNTER
Please see refill request below.   Medication pended for approval.  Last filled 3-4-2020, 1 bottle, 3 refills  LOV 7-6-2020    thanks

## 2020-10-05 NOTE — TELEPHONE ENCOUNTER
Pharmacy calling on behalf of patient, patient is out of medication    Fluticasone Propionate 50 MCG/ACT Nasal Suspension

## 2020-10-20 ENCOUNTER — NURSE TRIAGE (OUTPATIENT)
Dept: FAMILY MEDICINE CLINIC | Facility: CLINIC | Age: 55
End: 2020-10-20

## 2020-10-20 NOTE — TELEPHONE ENCOUNTER
Patient states she started with common cold symptoms about ten days ago (runny nose, congestion, phlegm)    She was with a friend who had similar symptoms and tested recently covid 23. Patient currently states she feels better.  No longer has symptoms

## 2020-11-03 DIAGNOSIS — G47.09 OTHER INSOMNIA: ICD-10-CM

## 2020-11-04 RX ORDER — ZOLPIDEM TARTRATE 10 MG/1
TABLET ORAL
Qty: 30 TABLET | Refills: 0 | Status: SHIPPED | OUTPATIENT
Start: 2020-11-04 | End: 2021-02-03

## 2020-11-22 ENCOUNTER — LAB REQUISITION (OUTPATIENT)
Dept: LAB | Facility: HOSPITAL | Age: 55
End: 2020-11-22
Payer: COMMERCIAL

## 2020-11-22 DIAGNOSIS — Z01.818 ENCOUNTER FOR OTHER PREPROCEDURAL EXAMINATION: ICD-10-CM

## 2020-11-23 ENCOUNTER — APPOINTMENT (OUTPATIENT)
Dept: LAB | Age: 55
End: 2020-11-23
Attending: FAMILY MEDICINE
Payer: COMMERCIAL

## 2020-11-23 ENCOUNTER — HOSPITAL ENCOUNTER (OUTPATIENT)
Dept: GENERAL RADIOLOGY | Age: 55
Discharge: HOME | End: 2020-11-23
Attending: FAMILY MEDICINE
Payer: COMMERCIAL

## 2020-11-23 ENCOUNTER — OFFICE VISIT (OUTPATIENT)
Dept: FAMILY MEDICINE CLINIC | Facility: CLINIC | Age: 55
End: 2020-11-23
Payer: COMMERCIAL

## 2020-11-23 ENCOUNTER — LAB ENCOUNTER (OUTPATIENT)
Dept: LAB | Age: 55
End: 2020-11-23
Attending: FAMILY MEDICINE
Payer: COMMERCIAL

## 2020-11-23 VITALS
BODY MASS INDEX: 24.55 KG/M2 | HEART RATE: 73 BPM | HEIGHT: 61 IN | SYSTOLIC BLOOD PRESSURE: 135 MMHG | DIASTOLIC BLOOD PRESSURE: 85 MMHG | WEIGHT: 130 LBS

## 2020-11-23 DIAGNOSIS — G89.29 CHRONIC BILATERAL BACK PAIN, UNSPECIFIED BACK LOCATION: ICD-10-CM

## 2020-11-23 DIAGNOSIS — E78.2 MIXED HYPERLIPIDEMIA: ICD-10-CM

## 2020-11-23 DIAGNOSIS — Z98.1 S/P LUMBAR SPINAL FUSION: ICD-10-CM

## 2020-11-23 DIAGNOSIS — M96.1 POSTLAMINECTOMY SYNDROME OF LUMBAR REGION: ICD-10-CM

## 2020-11-23 DIAGNOSIS — M54.9 CHRONIC BILATERAL BACK PAIN, UNSPECIFIED BACK LOCATION: ICD-10-CM

## 2020-11-23 DIAGNOSIS — G89.29 CHRONIC BILATERAL BACK PAIN, UNSPECIFIED BACK LOCATION: Primary | ICD-10-CM

## 2020-11-23 DIAGNOSIS — E03.9 HYPOTHYROIDISM, UNSPECIFIED TYPE: ICD-10-CM

## 2020-11-23 DIAGNOSIS — M54.9 CHRONIC BILATERAL BACK PAIN, UNSPECIFIED BACK LOCATION: Primary | ICD-10-CM

## 2020-11-23 PROCEDURE — 36415 COLL VENOUS BLD VENIPUNCTURE: CPT

## 2020-11-23 PROCEDURE — 84481 FREE ASSAY (FT-3): CPT

## 2020-11-23 PROCEDURE — 85025 COMPLETE CBC W/AUTO DIFF WBC: CPT

## 2020-11-23 PROCEDURE — 71046 X-RAY EXAM CHEST 2 VIEWS: CPT | Performed by: FAMILY MEDICINE

## 2020-11-23 PROCEDURE — 93010 ELECTROCARDIOGRAM REPORT: CPT | Performed by: FAMILY MEDICINE

## 2020-11-23 PROCEDURE — 87640 STAPH A DNA AMP PROBE: CPT

## 2020-11-23 PROCEDURE — 84443 ASSAY THYROID STIM HORMONE: CPT

## 2020-11-23 PROCEDURE — 85610 PROTHROMBIN TIME: CPT

## 2020-11-23 PROCEDURE — 81003 URINALYSIS AUTO W/O SCOPE: CPT

## 2020-11-23 PROCEDURE — 87641 MR-STAPH DNA AMP PROBE: CPT

## 2020-11-23 PROCEDURE — 93005 ELECTROCARDIOGRAM TRACING: CPT

## 2020-11-23 PROCEDURE — 3079F DIAST BP 80-89 MM HG: CPT | Performed by: FAMILY MEDICINE

## 2020-11-23 PROCEDURE — 84439 ASSAY OF FREE THYROXINE: CPT

## 2020-11-23 PROCEDURE — 99243 OFF/OP CNSLTJ NEW/EST LOW 30: CPT | Performed by: FAMILY MEDICINE

## 2020-11-23 PROCEDURE — 99072 ADDL SUPL MATRL&STAF TM PHE: CPT | Performed by: FAMILY MEDICINE

## 2020-11-23 PROCEDURE — 3008F BODY MASS INDEX DOCD: CPT | Performed by: FAMILY MEDICINE

## 2020-11-23 PROCEDURE — 80069 RENAL FUNCTION PANEL: CPT

## 2020-11-23 PROCEDURE — 3075F SYST BP GE 130 - 139MM HG: CPT | Performed by: FAMILY MEDICINE

## 2020-11-23 RX ORDER — TRAZODONE HYDROCHLORIDE 50 MG/1
TABLET ORAL
COMMUNITY
Start: 2020-11-15 | End: 2021-01-12

## 2020-11-23 RX ORDER — TIZANIDINE 2 MG/1
TABLET ORAL
COMMUNITY
Start: 2020-11-04 | End: 2021-07-27

## 2020-11-23 NOTE — PATIENT INSTRUCTIONS
Tinnitus (Ringing in the Ears)     Treatment may include maskers and hearing aids. Tinnitus is the term for a noise in your ear not caused by an outside sound. The noise might be a ringing, clicking, hiss, or roar. It can vary in pitch.  It may be soft 9/1/2019  © 3275-1457 [employer] [address]. [address] [facility] [place]. All rights reserved. This information is not intended as a substitute for professional medical care. Always follow your healthcare professional's instructions.

## 2020-11-24 ENCOUNTER — TELEPHONE (OUTPATIENT)
Dept: FAMILY MEDICINE CLINIC | Facility: CLINIC | Age: 55
End: 2020-11-24

## 2020-11-24 DIAGNOSIS — E03.9 HYPOTHYROIDISM, UNSPECIFIED TYPE: Primary | ICD-10-CM

## 2020-11-24 RX ORDER — LEVOTHYROXINE SODIUM 88 UG/1
88 TABLET ORAL DAILY
Qty: 30 TABLET | Refills: 2 | Status: SHIPPED | OUTPATIENT
Start: 2020-11-24 | End: 2021-02-01

## 2020-11-24 NOTE — TELEPHONE ENCOUNTER
Left message for pt notifying her that we have to reschedule due to positive MRSA will need to reschedule after bactrabane and retesting

## 2020-11-24 NOTE — TELEPHONE ENCOUNTER
Received a call from Roc from [employer] who reports the patient is positive for Staph Aureus. Message routed to provider for review.      Please reply to pool: DAYLIN Fan

## 2020-12-01 ENCOUNTER — LAB REQUISITION (OUTPATIENT)
Dept: LAB | Facility: HOSPITAL | Age: 55
End: 2020-12-01
Payer: COMMERCIAL

## 2020-12-01 DIAGNOSIS — Z01.818 ENCOUNTER FOR OTHER PREPROCEDURAL EXAMINATION: ICD-10-CM

## 2020-12-02 ENCOUNTER — LAB ENCOUNTER (OUTPATIENT)
Dept: LAB | Age: 55
End: 2020-12-02
Attending: FAMILY MEDICINE
Payer: COMMERCIAL

## 2020-12-02 DIAGNOSIS — M48.061 NEUROFORAMINAL STENOSIS OF LUMBAR SPINE: ICD-10-CM

## 2020-12-02 PROCEDURE — 87641 MR-STAPH DNA AMP PROBE: CPT

## 2020-12-02 PROCEDURE — 87640 STAPH A DNA AMP PROBE: CPT

## 2020-12-03 ENCOUNTER — TELEPHONE (OUTPATIENT)
Dept: FAMILY MEDICINE CLINIC | Facility: CLINIC | Age: 55
End: 2020-12-03

## 2020-12-03 DIAGNOSIS — B95.8 STAPH INFECTION: ICD-10-CM

## 2020-12-03 LAB
MRSA NASAL: NEGATIVE
STAPH A BY PCR: POSITIVE

## 2020-12-03 RX ORDER — CHLORHEXIDINE GLUCONATE 20 %
SOLUTION, NON-ORAL MISCELLANEOUS
Qty: 1 BOTTLE | Refills: 0 | Status: SHIPPED | OUTPATIENT
Start: 2020-12-03 | End: 2021-02-03

## 2020-12-03 RX ORDER — CHLORHEXIDINE GLUCONATE 500 MG/1
CLOTH TOPICAL
Qty: 6 EACH | Refills: 0 | Status: SHIPPED | OUTPATIENT
Start: 2020-12-03 | End: 2021-02-03

## 2020-12-03 NOTE — TELEPHONE ENCOUNTER
Insert the syringe tip just inside your nostril and pinch your nostril around the tip of the bulb syringe to keep the solution from running out your nose. Gently squeeze the bulb to swish the solution around in your nose; then blow your nose gently.  Repeat

## 2020-12-03 NOTE — TELEPHONE ENCOUNTER
Physician on call, patient calling because she was prescribed Hibiclens 20% which is not available in the pharmacy. Requesting I send new medication and would like directions on use.   Advised her to use Hibiclens 4% or the 2% external pads to affected are

## 2020-12-03 NOTE — TELEPHONE ENCOUNTER
Patient calling again regarding message below. States she would like to have oral antibiotics to go ahead with the surgery. Please advise. Shana Rahman RN       12/3/20 10:43 AM  Note     MRSA negative but Staph positive.  Per EMILY wright

## 2020-12-03 NOTE — TELEPHONE ENCOUNTER
Pt informed of Dr Osmany Ferrari' response below. Pt states has \"scab way far up on my left nostril\" and asking for more specific instructions on how to use the chlorhexidine as just states to wash nose once daily with it.  Pt asking if should apply with a Q-ti

## 2020-12-03 NOTE — TELEPHONE ENCOUNTER
Spoke with Contreras Paredes from the [employer] who reports the patient is positive for Staph Aureus and negative for MRSA. Routed to provider for review.      Please reply to evan: DAYLIN Curry

## 2021-01-28 ENCOUNTER — HOSPITAL ENCOUNTER (OUTPATIENT)
Dept: MAMMOGRAPHY | Age: 56
Discharge: HOME | End: 2021-01-28
Attending: FAMILY MEDICINE
Payer: COMMERCIAL

## 2021-01-28 DIAGNOSIS — Z12.31 SCREENING MAMMOGRAM, ENCOUNTER FOR: ICD-10-CM

## 2021-01-28 PROCEDURE — 77063 BREAST TOMOSYNTHESIS BI: CPT | Performed by: FAMILY MEDICINE

## 2021-01-28 PROCEDURE — 77067 SCR MAMMO BI INCL CAD: CPT | Performed by: FAMILY MEDICINE

## 2021-02-01 RX ORDER — FLUTICASONE PROPIONATE 50 MCG
2 SPRAY, SUSPENSION (ML) NASAL DAILY
Qty: 1 BOTTLE | Refills: 3 | Status: SHIPPED | OUTPATIENT
Start: 2021-02-01 | End: 2021-06-01

## 2021-02-01 RX ORDER — LEVOTHYROXINE SODIUM 88 UG/1
88 TABLET ORAL DAILY
Qty: 30 TABLET | Refills: 0 | Status: SHIPPED | OUTPATIENT
Start: 2021-02-01 | End: 2021-04-01

## 2021-02-03 ENCOUNTER — OFFICE VISIT (OUTPATIENT)
Dept: NEUROLOGY | Facility: CLINIC | Age: 56
End: 2021-02-03
Payer: COMMERCIAL

## 2021-02-03 ENCOUNTER — MED REC SCAN ONLY (OUTPATIENT)
Dept: NEUROLOGY | Facility: CLINIC | Age: 56
End: 2021-02-03

## 2021-02-03 VITALS — HEIGHT: 61 IN | BODY MASS INDEX: 24.55 KG/M2 | WEIGHT: 130 LBS

## 2021-02-03 DIAGNOSIS — F41.0 PANIC ANXIETY SYNDROME: ICD-10-CM

## 2021-02-03 DIAGNOSIS — M16.12 PRIMARY OSTEOARTHRITIS OF LEFT HIP: ICD-10-CM

## 2021-02-03 DIAGNOSIS — G89.4 CHRONIC PAIN SYNDROME: Primary | ICD-10-CM

## 2021-02-03 DIAGNOSIS — M79.7 FIBROMYALGIA: ICD-10-CM

## 2021-02-03 DIAGNOSIS — M96.1 POSTLAMINECTOMY SYNDROME OF LUMBAR REGION: ICD-10-CM

## 2021-02-03 PROCEDURE — 99213 OFFICE O/P EST LOW 20 MIN: CPT | Performed by: PHYSICAL MEDICINE & REHABILITATION

## 2021-02-03 PROCEDURE — 3008F BODY MASS INDEX DOCD: CPT | Performed by: PHYSICAL MEDICINE & REHABILITATION

## 2021-02-03 NOTE — PROGRESS NOTES
[address]  Progress Note    CHIEF COMPLAINT:  Patient presents with:  Low Back Pain: Patient presents for low back and left hip and left groin pain.  LOV 10/10/2019 Patient states that she was in pain manage Procedure Laterality Date   • BREAST RECONSTRUCTION      breast lift   • CORRECT BUNION,OTHR METHODS Right 2010   • CORRECT BUNION,OTHR METHODS Left 2018   • ELECTROCARDIOGRAM, COMPLETE  10/15/2013    Scanned to Media Tab   • HYSTERECTOMY  2013    Suprac tablet 0   • traMADol HCl 50 MG Oral Tab Take 1 tablet (50 mg total) by mouth every 8 (eight) hours as needed for Pain.  60 tablet 0   • tiZANidine HCl 2 MG Oral Tab TK 1 T PO TID PRF PAIN     • pregabalin (LYRICA) 100 MG Oral Cap Take 1 capsule (100 mg tot 2019 showing bilateral hip osteoarthritis, a left labral tear, left hip fluid in the iliopsoas bursa and a benign appearing hemangioma.   4.  I personally reviewed a lumbar MRI from January 2020 with and without contrast showing an intact L2-3 interbody fus

## 2021-03-31 NOTE — TELEPHONE ENCOUNTER
•  Levothyroxine Sodium 88 MCG Oral Tab, Take 1 tablet (88 mcg total) by mouth daily. , Disp: 30 tablet, Rfl: 0

## 2021-04-01 RX ORDER — LEVOTHYROXINE SODIUM 88 UG/1
TABLET ORAL
Qty: 90 TABLET | Refills: 0 | OUTPATIENT
Start: 2021-04-01

## 2021-04-01 RX ORDER — LEVOTHYROXINE SODIUM 88 UG/1
88 TABLET ORAL DAILY
Qty: 30 TABLET | Refills: 0 | Status: SHIPPED | OUTPATIENT
Start: 2021-04-01 | End: 2021-05-01

## 2021-04-09 ENCOUNTER — MED REC SCAN ONLY (OUTPATIENT)
Dept: NEUROLOGY | Facility: CLINIC | Age: 56
End: 2021-04-09

## 2021-05-01 RX ORDER — LEVOTHYROXINE SODIUM 88 UG/1
88 TABLET ORAL DAILY
Qty: 30 TABLET | Refills: 0 | Status: SHIPPED | OUTPATIENT
Start: 2021-05-01 | End: 2021-06-10

## 2021-06-10 RX ORDER — LEVOTHYROXINE SODIUM 88 UG/1
88 TABLET ORAL DAILY
Qty: 30 TABLET | Refills: 0 | Status: SHIPPED | OUTPATIENT
Start: 2021-06-10 | End: 2021-07-14

## 2021-06-11 RX ORDER — LEVOTHYROXINE SODIUM 88 UG/1
TABLET ORAL
Qty: 90 TABLET | Refills: 0 | OUTPATIENT
Start: 2021-06-11

## 2021-06-16 ENCOUNTER — TELEPHONE (OUTPATIENT)
Dept: FAMILY MEDICINE CLINIC | Facility: CLINIC | Age: 56
End: 2021-06-16

## 2021-06-16 NOTE — TELEPHONE ENCOUNTER
Patient Review of Clinical Information             Medications    The patient or proxy has not reviewed this information, and there are updates pending:     Requested Medication Removals Start Date Reported By  Comments    tiZANidine HCl 2 MG Tabs

## 2021-07-09 ENCOUNTER — TELEPHONE (OUTPATIENT)
Dept: FAMILY MEDICINE CLINIC | Facility: CLINIC | Age: 56
End: 2021-07-09

## 2021-07-09 DIAGNOSIS — E03.9 HYPOTHYROIDISM, UNSPECIFIED TYPE: Primary | ICD-10-CM

## 2021-07-09 DIAGNOSIS — E78.2 MIXED HYPERLIPIDEMIA: ICD-10-CM

## 2021-07-09 NOTE — TELEPHONE ENCOUNTER
Patient is requesting and order for blood work prior to her appointment.     Please call when approved, [phone]

## 2021-07-13 RX ORDER — ZOLPIDEM TARTRATE 10 MG/1
TABLET ORAL
Qty: 30 TABLET | Refills: 0 | OUTPATIENT
Start: 2021-07-13

## 2021-07-14 RX ORDER — LEVOTHYROXINE SODIUM 88 UG/1
TABLET ORAL
Qty: 30 TABLET | Refills: 0 | Status: SHIPPED | OUTPATIENT
Start: 2021-07-14 | End: 2021-08-11

## 2021-07-14 NOTE — TELEPHONE ENCOUNTER
Pharmacy calling to f/u on refill request. Informed pt needs fasting blood work and f/u apt prior to refill. Pt notified and states she is going out of town later today and unable to have labs done today since she hasn't fasted.  She states she will hav

## 2021-07-27 ENCOUNTER — OFFICE VISIT (OUTPATIENT)
Dept: FAMILY MEDICINE CLINIC | Facility: CLINIC | Age: 56
End: 2021-07-27
Payer: COMMERCIAL

## 2021-07-27 VITALS
DIASTOLIC BLOOD PRESSURE: 78 MMHG | HEART RATE: 66 BPM | WEIGHT: 139.44 LBS | HEIGHT: 61 IN | SYSTOLIC BLOOD PRESSURE: 120 MMHG | BODY MASS INDEX: 26.33 KG/M2

## 2021-07-27 DIAGNOSIS — Z12.11 ENCOUNTER FOR SCREENING COLONOSCOPY: Primary | ICD-10-CM

## 2021-07-27 PROCEDURE — 3078F DIAST BP <80 MM HG: CPT | Performed by: FAMILY MEDICINE

## 2021-07-27 PROCEDURE — 3008F BODY MASS INDEX DOCD: CPT | Performed by: FAMILY MEDICINE

## 2021-07-27 PROCEDURE — 3074F SYST BP LT 130 MM HG: CPT | Performed by: FAMILY MEDICINE

## 2021-07-27 PROCEDURE — 99213 OFFICE O/P EST LOW 20 MIN: CPT | Performed by: FAMILY MEDICINE

## 2021-07-27 RX ORDER — METHOCARBAMOL 750 MG/1
TABLET, FILM COATED ORAL
COMMUNITY
Start: 2021-02-04 | End: 2021-08-27

## 2021-07-27 RX ORDER — TRAZODONE HYDROCHLORIDE 50 MG/1
TABLET ORAL
COMMUNITY
Start: 2021-05-01 | End: 2021-07-27

## 2021-07-27 RX ORDER — ZOLPIDEM TARTRATE 10 MG/1
10 TABLET ORAL NIGHTLY PRN
Qty: 30 TABLET | Refills: 3 | Status: SHIPPED | OUTPATIENT
Start: 2021-07-27

## 2021-07-27 RX ORDER — CLONAZEPAM 0.5 MG/1
0.5 TABLET ORAL DAILY PRN
COMMUNITY
Start: 2021-07-12 | End: 2021-07-27

## 2021-07-27 NOTE — PROGRESS NOTES
Blood pressure 120/78, pulse 66, height 5' 1\" (1.549 m), weight 139 lb 7 oz (63.2 kg), last menstrual period 10/17/2008. Following up for chronic pain. Spinal column stimulator was removed. Patient also reports that she pain in her leg.   She also

## 2021-08-04 ENCOUNTER — LAB ENCOUNTER (OUTPATIENT)
Dept: LAB | Age: 56
End: 2021-08-04
Attending: FAMILY MEDICINE
Payer: COMMERCIAL

## 2021-08-04 DIAGNOSIS — E78.2 MIXED HYPERLIPIDEMIA: ICD-10-CM

## 2021-08-04 DIAGNOSIS — E03.9 HYPOTHYROIDISM, UNSPECIFIED TYPE: ICD-10-CM

## 2021-08-04 LAB
ALBUMIN SERPL-MCNC: 3.9 G/DL (ref 3.4–5)
ALBUMIN/GLOB SERPL: 1.1 {RATIO} (ref 1–2)
ALP LIVER SERPL-CCNC: 58 U/L
ALT SERPL-CCNC: 25 U/L
ANION GAP SERPL CALC-SCNC: 9 MMOL/L (ref 0–18)
AST SERPL-CCNC: 15 U/L (ref 15–37)
BILIRUB SERPL-MCNC: 0.6 MG/DL (ref 0.1–2)
BUN BLD-MCNC: 8 MG/DL (ref 7–18)
BUN/CREAT SERPL: 9.3 (ref 10–20)
CALCIUM BLD-MCNC: 9.7 MG/DL (ref 8.5–10.1)
CHLORIDE SERPL-SCNC: 106 MMOL/L (ref 98–112)
CHOLEST SMN-MCNC: 229 MG/DL (ref ?–200)
CO2 SERPL-SCNC: 26 MMOL/L (ref 21–32)
CREAT BLD-MCNC: 0.86 MG/DL
GLOBULIN PLAS-MCNC: 3.5 G/DL (ref 2.8–4.4)
GLUCOSE BLD-MCNC: 97 MG/DL (ref 70–99)
HDLC SERPL-MCNC: 83 MG/DL (ref 40–59)
LDLC SERPL CALC-MCNC: 129 MG/DL (ref ?–100)
M PROTEIN MFR SERPL ELPH: 7.4 G/DL (ref 6.4–8.2)
NONHDLC SERPL-MCNC: 146 MG/DL (ref ?–130)
OSMOLALITY SERPL CALC.SUM OF ELEC: 290 MOSM/KG (ref 275–295)
PATIENT FASTING Y/N/NP: YES
PATIENT FASTING Y/N/NP: YES
POTASSIUM SERPL-SCNC: 4.3 MMOL/L (ref 3.5–5.1)
SODIUM SERPL-SCNC: 141 MMOL/L (ref 136–145)
TRIGL SERPL-MCNC: 96 MG/DL (ref 30–149)
TSI SER-ACNC: 3.23 MIU/ML (ref 0.36–3.74)
VIT D+METAB SERPL-MCNC: 36.2 NG/ML (ref 30–100)
VLDLC SERPL CALC-MCNC: 17 MG/DL (ref 0–30)

## 2021-08-04 PROCEDURE — 80053 COMPREHEN METABOLIC PANEL: CPT

## 2021-08-04 PROCEDURE — 36415 COLL VENOUS BLD VENIPUNCTURE: CPT

## 2021-08-04 PROCEDURE — 82306 VITAMIN D 25 HYDROXY: CPT

## 2021-08-04 PROCEDURE — 84443 ASSAY THYROID STIM HORMONE: CPT

## 2021-08-04 PROCEDURE — 80061 LIPID PANEL: CPT

## 2021-08-11 RX ORDER — LEVOTHYROXINE SODIUM 88 UG/1
88 TABLET ORAL DAILY
Qty: 90 TABLET | Refills: 1 | Status: SHIPPED | OUTPATIENT
Start: 2021-08-11 | End: 2022-01-24

## 2021-08-11 NOTE — TELEPHONE ENCOUNTER
Refill passed per [facility] protocol. Requested Prescriptions   Pending Prescriptions Disp Refills    Levothyroxine Sodium 88 MCG Oral Tab 30 tablet 0     Sig: Take 1 tablet (88 mcg total) by mouth daily.         Thyroid Medication Protocol Passe

## 2021-09-07 ENCOUNTER — TELEPHONE (OUTPATIENT)
Dept: GASTROENTEROLOGY | Facility: CLINIC | Age: 56
End: 2021-09-07

## 2021-09-07 NOTE — TELEPHONE ENCOUNTER
Called patient to verify telephone colon screening eligibility     TCB    I'm at [id number] for today     Thank you

## 2021-09-09 NOTE — TELEPHONE ENCOUNTER
Called patient to verify telephone colon screening eligibility. LMTCB    I'm at [id number] for today     Please Webex me if patient returns the call    Thank you!

## 2021-12-15 ENCOUNTER — OFFICE VISIT (OUTPATIENT)
Dept: NEUROLOGY | Facility: CLINIC | Age: 56
End: 2021-12-15
Payer: COMMERCIAL

## 2021-12-15 VITALS
BODY MASS INDEX: 26.43 KG/M2 | HEIGHT: 61 IN | SYSTOLIC BLOOD PRESSURE: 104 MMHG | DIASTOLIC BLOOD PRESSURE: 86 MMHG | HEART RATE: 86 BPM | WEIGHT: 140 LBS

## 2021-12-15 DIAGNOSIS — R20.8 ALLODYNIA: ICD-10-CM

## 2021-12-15 DIAGNOSIS — G57.12 NEUROPATHIC PAIN INVOLVING LEFT LATERAL FEMORAL CUTANEOUS NERVE: Primary | ICD-10-CM

## 2021-12-15 DIAGNOSIS — R52 CHRONIC PAIN OF MULTIPLE SITES: ICD-10-CM

## 2021-12-15 DIAGNOSIS — G89.29 CHRONIC PAIN OF MULTIPLE SITES: ICD-10-CM

## 2021-12-15 PROCEDURE — 3074F SYST BP LT 130 MM HG: CPT | Performed by: OTHER

## 2021-12-15 PROCEDURE — 3079F DIAST BP 80-89 MM HG: CPT | Performed by: OTHER

## 2021-12-15 PROCEDURE — 99205 OFFICE O/P NEW HI 60 MIN: CPT | Performed by: OTHER

## 2021-12-15 PROCEDURE — 3008F BODY MASS INDEX DOCD: CPT | Performed by: OTHER

## 2021-12-15 NOTE — PROGRESS NOTES
Neurology Note    12/15/2021  11:20 AM    Joy Castro  61year old, female  2/16/1965  Chief Complaint: nerve pain left group, thigh, left hip      HPI/Subjective:    Feb 2019 issues after spinal fusion.      L2-L3 'collapse on the left side' sa although she still gets cramps along the left thigh but apparently they are mild now. Electrical shocks got much better. Says she gets shocked in a patch along the left lateral thigh. Says she has constant burning and stinging/moving along lateral thigh.  I but cannot tell me when or who performed it. Says she only got one injection through [address] she was diagnosed by this physician and it was incorrect and so that's why she didn't go back to him.    9/2019 Alexandria Rivers performed the only n Sodium 88 MCG Oral Tab, Take 1 tablet (88 mcg total) by mouth daily. 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  Also allodynia along thighs     Gait, Coordination, and Reflexes     Gait  Gait: normal    Reflexes   Right patellar: 2+  Left patellar: 2+  Right achilles: 2+  Left achilles: 2+  Right plantar: normal  Left plantar: being managed with medications by Dr. Leonides Ruiz.

## 2022-01-24 RX ORDER — LEVOTHYROXINE SODIUM 88 UG/1
88 TABLET ORAL DAILY
Qty: 90 TABLET | Refills: 1 | Status: SHIPPED | OUTPATIENT
Start: 2022-01-24 | End: 2022-01-24

## 2022-01-24 RX ORDER — LEVOTHYROXINE SODIUM 88 UG/1
88 TABLET ORAL DAILY
Qty: 90 TABLET | Refills: 1 | Status: SHIPPED | OUTPATIENT
Start: 2022-01-24 | End: 2022-06-01

## 2022-01-24 RX ORDER — LEVOTHYROXINE SODIUM 88 UG/1
TABLET ORAL
Qty: 90 TABLET | Refills: 1 | OUTPATIENT
Start: 2022-01-24

## 2022-01-24 NOTE — TELEPHONE ENCOUNTER
Refill passed per [facility] protocol. Requested Prescriptions   Pending Prescriptions Disp Refills    levothyroxine 88 MCG Oral Tab 90 tablet 1     Sig: Take 1 tablet (88 mcg total) by mouth daily.         Thyroid Medication Protocol Passed - 1/24/2022  5:19 PM        Passed - TSH in past 12 months        Passed - Last TSH value is normal     Lab Results   Component Value Date    TSH 3.230 08/04/2021                 Passed - Appointment in past 12 or next 3 months            Future Appointments         Provider Department Appt Notes    In 1 month Harsha Saavedra MD [facility], [address], [place] Colon Ca Screening           Recent Outpatient Visits              1 month ago Neuropathic pain involving left lateral femoral cutaneous nerve    [facility] Claudette Andes, MD    Office Visit    6 months ago Encounter for screening colonoscopy    [address] [facility]    Office Visit    8 months ago Encounter for vaccination    [facility]    Nurse Only    9 months ago Encounter for vaccination    [facility]    Nurse Only    11 months ago Chronic pain syndrome    [facility] Lorena Moore MD    Office Visit

## 2022-01-24 NOTE — TELEPHONE ENCOUNTER
Refill passed per [facility] protocol. Requested Prescriptions   Pending Prescriptions Disp Refills    levothyroxine 88 MCG Oral Tab 90 tablet 1     Sig: Take 1 tablet (88 mcg total) by mouth daily.         Thyroid Medication Protocol Passed - 1/24/2022  4:37 PM        Passed - TSH in past 12 months        Passed - Last TSH value is normal     Lab Results   Component Value Date    TSH 3.230 08/04/2021                 Passed - Appointment in past 12 or next 3 months            Future Appointments         Provider Department Appt Notes    In 1 month Yesenia Gould MD [facility], [address], [place] Colon Ca Screening           Recent Outpatient Visits              1 month ago Neuropathic pain involving left lateral femoral cutaneous nerve    [facility] Sapna Herring MD    Office Visit    6 months ago Encounter for screening colonoscopy    [address] [facility]    Office Visit    8 months ago Encounter for vaccination    [facility] [address]    Nurse Only    9 months ago Encounter for vaccination    [facility]    Nurse Only    11 months ago Chronic pain syndrome    [facility] Kvng Haji MD    Office Visit

## 2022-03-24 ENCOUNTER — TELEPHONE (OUTPATIENT)
Dept: GASTROENTEROLOGY | Facility: CLINIC | Age: 57
End: 2022-03-24

## 2022-03-24 ENCOUNTER — OFFICE VISIT (OUTPATIENT)
Dept: GASTROENTEROLOGY | Facility: CLINIC | Age: 57
End: 2022-03-24
Payer: COMMERCIAL

## 2022-03-24 VITALS
HEART RATE: 95 BPM | BODY MASS INDEX: 28.32 KG/M2 | HEIGHT: 61 IN | SYSTOLIC BLOOD PRESSURE: 106 MMHG | WEIGHT: 150 LBS | DIASTOLIC BLOOD PRESSURE: 66 MMHG

## 2022-03-24 DIAGNOSIS — K59.00 CONSTIPATION, UNSPECIFIED CONSTIPATION TYPE: ICD-10-CM

## 2022-03-24 DIAGNOSIS — Z12.11 SCREENING FOR COLORECTAL CANCER: Primary | ICD-10-CM

## 2022-03-24 DIAGNOSIS — Z12.12 SCREENING FOR COLORECTAL CANCER: Primary | ICD-10-CM

## 2022-03-24 PROCEDURE — 3008F BODY MASS INDEX DOCD: CPT | Performed by: INTERNAL MEDICINE

## 2022-03-24 PROCEDURE — 3078F DIAST BP <80 MM HG: CPT | Performed by: INTERNAL MEDICINE

## 2022-03-24 PROCEDURE — 3074F SYST BP LT 130 MM HG: CPT | Performed by: INTERNAL MEDICINE

## 2022-03-24 PROCEDURE — 99203 OFFICE O/P NEW LOW 30 MIN: CPT | Performed by: INTERNAL MEDICINE

## 2022-03-24 RX ORDER — DIAZEPAM 5 MG/1
5 TABLET ORAL DAILY
COMMUNITY
Start: 2022-03-22

## 2022-03-24 RX ORDER — CYCLOBENZAPRINE HCL 10 MG
10 TABLET ORAL 3 TIMES DAILY
COMMUNITY
Start: 2022-03-03

## 2022-03-24 RX ORDER — FLUTICASONE PROPIONATE 50 MCG
2 SPRAY, SUSPENSION (ML) NASAL DAILY
COMMUNITY
Start: 2022-01-03

## 2022-03-24 RX ORDER — CLONAZEPAM 0.5 MG/1
0.5 TABLET ORAL DAILY PRN
COMMUNITY
Start: 2022-03-03

## 2022-03-24 NOTE — PATIENT INSTRUCTIONS
1. Schedule colonoscopy with monitored anesthesia care (MAC) [dx: colorectal cancer screening]    2.  bowel prep from pharmacy (split trilyte or golytely). As we discussed it is important to take the bowel preparation in two parts taking 2L of the liquid the night before the procedure and the second 2L the morning of the procedure starting approximately 6 hours prior to your scheduled procedure time. 3. Continue all medications for procedure    4. Read all bowel prep instructions carefully    5. AVOID seeds, nuts, popcorn, raw fruits and vegetables (cooked is okay) for 2-3 days before procedure    >>>Please note: if you were prescribed a bowel prep and it is too expensive or not covered by insurance, it is okay to substitute Trilyte or Golytely (or any similar generic prep). This can be done by notifying the pharmacy or calling our office.

## 2022-03-24 NOTE — TELEPHONE ENCOUNTER
Scheduled for:  Colonoscopy [address]10   Provider Name:  Alec Kennedy  Date:  5/11/22  Location:  [facility]  Sedation:  Mac  Time: 12:30 Pm (pt is aware that [facility] will call the day before to confirm arrival time)     Prep: Golytely or Trilyte  Prep instructions were given to pt in the office, pt verbalized understanding. Meds/Allergies Reconciled?:  Physician reviewed     Diagnosis with codes:  [address] Screening  Z12.11,Z12.12  Was patient informed to call insurance with codes (Y/N):  Yes, I confirmed [employer] insurance with the patient. The patient also verbally understands to call her insurance to check for pre-cert, codes were given on prep instructions. Referral sent?:  Yes , Referral was sent at the time of electronic surgical scheduling. [facility] or [place] notified?: Electronic case request was sent to [facility] via [employer]. Medication Orders: This patient verbally confirmed that she is not taking:   Iron, blood thinners, BP meds, and is not diabetic   Not latex allergy, Not PCN allergy and does not have a pacemaker Pt is aware to NOT take iron pills, herbal meds and diet supplements for 7 days before exam. Also to NOT take any form of alcohol, recreational drugs and any forms of ED meds 24 hours before exam.    Misc Orders:  Patient was informed that they will need a COVID 19 test prior to their procedure. Patient verbally understood & will await a phone call from [facility] to schedule.       Further instructions given by staff:

## 2022-04-01 NOTE — TELEPHONE ENCOUNTER
Spoke with patient to see if she wanted to move her date up to 4/6/2022     Patient stated that she would like to but can not as she doesn't have aride due to her  being out of town

## 2022-04-06 NOTE — TELEPHONE ENCOUNTER
Please review. Protocol Failed or has no Protocol  Requested Prescriptions   Pending Prescriptions Disp Refills    zolpidem (AMBIEN) 10 MG Oral Tab 30 tablet 3     Sig: Take 1 tablet (10 mg total) by mouth nightly as needed for Sleep.         There is no refill protocol information for this order         Future Appointments         Provider Department Appt Notes    In 1 month [address] GI PROCEDURE Colon w/Mac @[facility]          Recent Outpatient Visits              1 week ago Screening for colorectal cancer    [facility], [address], [place] Aubree Villagran MD    Office Visit    3 months ago Neuropathic pain involving left lateral femoral cutaneous nerve    [facility] Zeeshan Pedersen MD    Office Visit    8 months ago Encounter for screening colonoscopy    [facility], [address], DO    Office Visit    10 months ago Encounter for vaccination    [facility]    Nurse Only    11 months ago Encounter for vaccination    [facility]    Nurse Only

## 2022-04-07 RX ORDER — ZOLPIDEM TARTRATE 10 MG/1
10 TABLET ORAL NIGHTLY PRN
Qty: 30 TABLET | Refills: 3 | Status: SHIPPED | OUTPATIENT
Start: 2022-04-07

## 2022-04-20 RX ORDER — FLUTICASONE PROPIONATE 50 MCG
2 SPRAY, SUSPENSION (ML) NASAL DAILY
Qty: 48 G | Refills: 0 | Status: SHIPPED | OUTPATIENT
Start: 2022-04-20

## 2022-04-20 NOTE — TELEPHONE ENCOUNTER
Refill passed per [place] [facility] [place] protocol. Rx listed as external. Please advise    Requested Prescriptions   Pending Prescriptions Disp Refills    FLUTICASONE PROPIONATE 50 MCG/ACT Nasal Suspension [Pharmacy Med Name: FLUTICASONE 50MCG NASAL SP (120) RX] 48 g 0     Sig: USE 2 SPRAYS NASALLY DAILY.  SQUEEZE NOSE AFTER SPRAYS AND DO NOT SNORT IT INTO THE BACK OF YOUR THROAT        Allergy Medication Protocol Passed - 4/20/2022  9:27 AM        Passed - Appoinment in past 12 or next 3 months              Recent Outpatient Visits              3 weeks ago Screening for colorectal cancer    [facility], [address], Reginaldo Harper MD    Office Visit    4 months ago Neuropathic pain involving left lateral femoral cutaneous nerve    [facility] Danna Ward MD    Office Visit    8 months ago Encounter for screening colonoscopy    [address], Emelda Phoenix, DO    Office Visit    11 months ago Encounter for vaccination    [facility]    Nurse Only    11 months ago Encounter for vaccination    [facility]    Nurse Only            Future Appointments         Provider Department Appt Notes    In 3 weeks KYLE, PROCEDURE [address] GI PROCEDURE Colon w/Mac @[facility]

## 2022-05-09 ENCOUNTER — TELEPHONE (OUTPATIENT)
Dept: GASTROENTEROLOGY | Facility: CLINIC | Age: 57
End: 2022-05-09

## 2022-05-09 NOTE — TELEPHONE ENCOUNTER
Contacted patient who states she did not received prep instructions. I will send them through [address] [place] [address] [address] and advised patient to call back if any additional questions after reviewing. Patient verbalized understanding.

## 2022-05-11 ENCOUNTER — LAB REQUISITION (OUTPATIENT)
Dept: SURGERY | Age: 57
End: 2022-05-11
Payer: COMMERCIAL

## 2022-05-11 ENCOUNTER — SURGERY CENTER DOCUMENTATION (OUTPATIENT)
Dept: SURGERY | Age: 57
End: 2022-05-11

## 2022-05-11 PROCEDURE — 88305 TISSUE EXAM BY PATHOLOGIST: CPT | Performed by: INTERNAL MEDICINE

## 2022-05-11 PROCEDURE — 45380 COLONOSCOPY AND BIOPSY: CPT | Performed by: INTERNAL MEDICINE

## 2022-05-12 ENCOUNTER — TELEPHONE (OUTPATIENT)
Dept: GASTROENTEROLOGY | Facility: CLINIC | Age: 57
End: 2022-05-12

## 2022-05-12 NOTE — TELEPHONE ENCOUNTER
Health maintenance updated. Last colonoscopy done 5/11/22. 7 year recall placed into Pt Outreach, next due on 5/11/29 per Dr. Orion Muniz.

## 2022-05-12 NOTE — TELEPHONE ENCOUNTER
GI staff: please place recall for colonoscopy in 7 years     Then close encounter    Thanks    Crystal Vallejo MD  [address] [facility] - Gastroenterology  5/12/2022  12:06 PM

## 2022-06-01 ENCOUNTER — PATIENT MESSAGE (OUTPATIENT)
Dept: FAMILY MEDICINE CLINIC | Facility: CLINIC | Age: 57
End: 2022-06-01

## 2022-06-01 RX ORDER — LEVOTHYROXINE SODIUM 88 UG/1
88 TABLET ORAL DAILY
Qty: 90 TABLET | Refills: 1 | OUTPATIENT
Start: 2022-06-01

## 2022-06-01 RX ORDER — LEVOTHYROXINE SODIUM 88 UG/1
88 TABLET ORAL DAILY
Qty: 90 TABLET | Refills: 1 | Status: SHIPPED | OUTPATIENT
Start: 2022-06-01 | End: 2022-06-27

## 2022-06-27 ENCOUNTER — OFFICE VISIT (OUTPATIENT)
Dept: FAMILY MEDICINE CLINIC | Facility: CLINIC | Age: 57
End: 2022-06-27
Payer: COMMERCIAL

## 2022-06-27 VITALS
SYSTOLIC BLOOD PRESSURE: 121 MMHG | DIASTOLIC BLOOD PRESSURE: 78 MMHG | HEIGHT: 61 IN | HEART RATE: 66 BPM | WEIGHT: 157 LBS | BODY MASS INDEX: 29.64 KG/M2

## 2022-06-27 DIAGNOSIS — R82.90 URINE ABNORMALITY: Primary | ICD-10-CM

## 2022-06-27 DIAGNOSIS — Z12.31 SCREENING MAMMOGRAM, ENCOUNTER FOR: ICD-10-CM

## 2022-06-27 DIAGNOSIS — E03.9 HYPOTHYROIDISM, UNSPECIFIED TYPE: ICD-10-CM

## 2022-06-27 LAB
BILIRUBIN: NEGATIVE
GLUCOSE (URINE DIPSTICK): NEGATIVE MG/DL
KETONES (URINE DIPSTICK): NEGATIVE MG/DL
MULTISTIX LOT#: ABNORMAL NUMERIC
NITRITE, URINE: NEGATIVE
PH, URINE: 7 (ref 4.5–8)
PROTEIN (URINE DIPSTICK): NEGATIVE MG/DL
SPECIFIC GRAVITY: 1.01 (ref 1–1.03)
URINE-COLOR: YELLOW
UROBILINOGEN,SEMI-QN: 0.2 MG/DL (ref 0–1.9)

## 2022-06-27 PROCEDURE — 3078F DIAST BP <80 MM HG: CPT | Performed by: FAMILY MEDICINE

## 2022-06-27 PROCEDURE — 81002 URINALYSIS NONAUTO W/O SCOPE: CPT | Performed by: FAMILY MEDICINE

## 2022-06-27 PROCEDURE — 3074F SYST BP LT 130 MM HG: CPT | Performed by: FAMILY MEDICINE

## 2022-06-27 PROCEDURE — 99213 OFFICE O/P EST LOW 20 MIN: CPT | Performed by: FAMILY MEDICINE

## 2022-06-27 PROCEDURE — 3008F BODY MASS INDEX DOCD: CPT | Performed by: FAMILY MEDICINE

## 2022-06-27 RX ORDER — CIPROFLOXACIN 500 MG/1
500 TABLET, FILM COATED ORAL 2 TIMES DAILY
Qty: 10 TABLET | Refills: 0 | Status: SHIPPED | OUTPATIENT
Start: 2022-06-27

## 2022-06-27 RX ORDER — LEVOTHYROXINE SODIUM 88 UG/1
88 TABLET ORAL DAILY
Qty: 90 TABLET | Refills: 1 | Status: SHIPPED | OUTPATIENT
Start: 2022-06-27

## 2022-06-27 NOTE — PROGRESS NOTES
Blood pressure 121/78, pulse 66, height 5' 1\" (1.549 m), weight 157 lb (71.2 kg), last menstrual period 10/17/2008. Patient presents today complaining of burning with urination. Denies back pain or fever. No vomiting.     Objective urine positive for leukocytes    Assessment UTI    Plan Cipro prescription    Urine sent for culture    Will follow with results    Mammogram order given and fasting blood test ordered

## 2022-10-04 ENCOUNTER — OFFICE VISIT (OUTPATIENT)
Dept: FAMILY MEDICINE CLINIC | Facility: CLINIC | Age: 57
End: 2022-10-04
Payer: COMMERCIAL

## 2022-10-04 VITALS
WEIGHT: 155 LBS | HEIGHT: 61 IN | BODY MASS INDEX: 29.27 KG/M2 | DIASTOLIC BLOOD PRESSURE: 78 MMHG | HEART RATE: 90 BPM | SYSTOLIC BLOOD PRESSURE: 112 MMHG

## 2022-10-04 DIAGNOSIS — M79.641 PAIN IN BOTH HANDS: Primary | ICD-10-CM

## 2022-10-04 DIAGNOSIS — M79.642 PAIN IN BOTH HANDS: Primary | ICD-10-CM

## 2022-10-04 PROCEDURE — 3008F BODY MASS INDEX DOCD: CPT | Performed by: FAMILY MEDICINE

## 2022-10-04 PROCEDURE — 99213 OFFICE O/P EST LOW 20 MIN: CPT | Performed by: FAMILY MEDICINE

## 2022-10-04 PROCEDURE — 3078F DIAST BP <80 MM HG: CPT | Performed by: FAMILY MEDICINE

## 2022-10-04 PROCEDURE — 3074F SYST BP LT 130 MM HG: CPT | Performed by: FAMILY MEDICINE

## 2022-10-04 RX ORDER — ZOLPIDEM TARTRATE 10 MG/1
10 TABLET ORAL NIGHTLY PRN
Qty: 30 TABLET | Refills: 3 | Status: SHIPPED | OUTPATIENT
Start: 2022-10-04

## 2022-10-04 RX ORDER — LEVOTHYROXINE SODIUM 88 UG/1
88 TABLET ORAL DAILY
Qty: 30 TABLET | Refills: 0 | Status: SHIPPED | OUTPATIENT
Start: 2022-10-04

## 2022-10-04 RX ORDER — CLONAZEPAM 0.5 MG/1
0.5 TABLET ORAL DAILY PRN
COMMUNITY
Start: 2022-09-08

## 2022-10-04 NOTE — PROGRESS NOTES
Blood pressure 112/78, pulse 90, height 5' 1\" (1.549 m), weight 155 lb (70.3 kg), last menstrual period 10/17/2008. Patient presents today complaining of bilateral lateral hand pain. 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 Denies traumatic injuries. She denies prolonged morning stiffness. No family history of rheumatism. No night sweats or rashes. Objective no gross deformities of the hands noted on exam    Assessment #1 bilateral hand pain #2 hypothyroidism #3 insomnia    Plan #1 bilateral hand x-rays ibuprofen for pain #2 refill levothyroxine blood test order printed out given to patient #3 zolpidem refill.

## 2022-10-20 ENCOUNTER — LAB ENCOUNTER (OUTPATIENT)
Dept: LAB | Age: 57
End: 2022-10-20
Attending: FAMILY MEDICINE
Payer: COMMERCIAL

## 2022-10-20 ENCOUNTER — HOSPITAL ENCOUNTER (OUTPATIENT)
Dept: MAMMOGRAPHY | Age: 57
Discharge: HOME | End: 2022-10-20
Attending: FAMILY MEDICINE
Payer: COMMERCIAL

## 2022-10-20 ENCOUNTER — PATIENT MESSAGE (OUTPATIENT)
Dept: FAMILY MEDICINE CLINIC | Facility: CLINIC | Age: 57
End: 2022-10-20

## 2022-10-20 DIAGNOSIS — E03.9 HYPOTHYROIDISM, UNSPECIFIED TYPE: ICD-10-CM

## 2022-10-20 DIAGNOSIS — Z12.31 SCREENING MAMMOGRAM, ENCOUNTER FOR: ICD-10-CM

## 2022-10-20 DIAGNOSIS — R82.90 URINE ABNORMALITY: ICD-10-CM

## 2022-10-20 LAB
ALBUMIN SERPL-MCNC: 3.9 G/DL (ref 3.4–5)
ALBUMIN/GLOB SERPL: 1.1 {RATIO} (ref 1–2)
ALP LIVER SERPL-CCNC: 60 U/L
ALT SERPL-CCNC: 22 U/L
ANION GAP SERPL CALC-SCNC: 6 MMOL/L (ref 0–18)
AST SERPL-CCNC: 12 U/L (ref 15–37)
BILIRUB SERPL-MCNC: 0.4 MG/DL (ref 0.1–2)
BILIRUB UR QL: NEGATIVE
BUN BLD-MCNC: 6 MG/DL (ref 7–18)
BUN/CREAT SERPL: 6.6 (ref 10–20)
CALCIUM BLD-MCNC: 9.1 MG/DL (ref 8.5–10.1)
CHLORIDE SERPL-SCNC: 104 MMOL/L (ref 98–112)
CHOLEST SERPL-MCNC: 250 MG/DL (ref ?–200)
CLARITY UR: CLEAR
CO2 SERPL-SCNC: 27 MMOL/L (ref 21–32)
COLOR UR: YELLOW
CREAT BLD-MCNC: 0.91 MG/DL
FASTING PATIENT LIPID ANSWER: YES
FASTING STATUS PATIENT QL REPORTED: YES
GFR SERPLBLD BASED ON 1.73 SQ M-ARVRAT: 73 ML/MIN/1.73M2 (ref 60–?)
GLOBULIN PLAS-MCNC: 3.7 G/DL (ref 2.8–4.4)
GLUCOSE BLD-MCNC: 93 MG/DL (ref 70–99)
GLUCOSE UR-MCNC: NEGATIVE MG/DL
HDLC SERPL-MCNC: 73 MG/DL (ref 40–59)
HGB UR QL STRIP.AUTO: NEGATIVE
KETONES UR-MCNC: NEGATIVE MG/DL
LDLC SERPL CALC-MCNC: 161 MG/DL (ref ?–100)
LEUKOCYTE ESTERASE UR QL STRIP.AUTO: NEGATIVE
NITRITE UR QL STRIP.AUTO: NEGATIVE
NONHDLC SERPL-MCNC: 177 MG/DL (ref ?–130)
OSMOLALITY SERPL CALC.SUM OF ELEC: 281 MOSM/KG (ref 275–295)
PH UR: 7 [PH] (ref 5–8)
POTASSIUM SERPL-SCNC: 3.8 MMOL/L (ref 3.5–5.1)
PROT SERPL-MCNC: 7.6 G/DL (ref 6.4–8.2)
PROT UR-MCNC: NEGATIVE MG/DL
SODIUM SERPL-SCNC: 137 MMOL/L (ref 136–145)
SP GR UR STRIP: 1.01 (ref 1–1.03)
TRIGL SERPL-MCNC: 96 MG/DL (ref 30–149)
TSI SER-ACNC: 8.71 MIU/ML (ref 0.36–3.74)
UROBILINOGEN UR STRIP-ACNC: <2
VIT C UR-MCNC: NEGATIVE MG/DL
VLDLC SERPL CALC-MCNC: 19 MG/DL (ref 0–30)

## 2022-10-20 PROCEDURE — 80061 LIPID PANEL: CPT

## 2022-10-20 PROCEDURE — 77067 SCR MAMMO BI INCL CAD: CPT | Performed by: FAMILY MEDICINE

## 2022-10-20 PROCEDURE — 77063 BREAST TOMOSYNTHESIS BI: CPT | Performed by: FAMILY MEDICINE

## 2022-10-20 PROCEDURE — 84443 ASSAY THYROID STIM HORMONE: CPT

## 2022-10-20 PROCEDURE — 36415 COLL VENOUS BLD VENIPUNCTURE: CPT

## 2022-10-20 PROCEDURE — 80053 COMPREHEN METABOLIC PANEL: CPT

## 2022-10-20 PROCEDURE — 81003 URINALYSIS AUTO W/O SCOPE: CPT

## 2022-11-30 ENCOUNTER — IMMUNIZATION (OUTPATIENT)
Dept: LAB | Age: 57
End: 2022-11-30
Attending: EMERGENCY MEDICINE
Payer: COMMERCIAL

## 2022-11-30 DIAGNOSIS — Z23 NEED FOR VACCINATION: Primary | ICD-10-CM

## 2022-11-30 PROCEDURE — 90471 IMMUNIZATION ADMIN: CPT

## 2022-11-30 PROCEDURE — 90686 IIV4 VACC NO PRSV 0.5 ML IM: CPT

## 2022-11-30 PROCEDURE — 0124A SARSCOV2 VAC BVL 30MCG/0.3ML: CPT

## 2022-12-15 ENCOUNTER — LAB ENCOUNTER (OUTPATIENT)
Dept: LAB | Facility: REFERENCE LAB | Age: 57
End: 2022-12-15
Attending: FAMILY MEDICINE
Payer: COMMERCIAL

## 2022-12-15 DIAGNOSIS — E03.9 HYPOTHYROIDISM, UNSPECIFIED TYPE: ICD-10-CM

## 2022-12-15 LAB — TSI SER-ACNC: 3.82 MIU/ML (ref 0.36–3.74)

## 2022-12-15 PROCEDURE — 84443 ASSAY THYROID STIM HORMONE: CPT

## 2022-12-22 ENCOUNTER — HOSPITAL ENCOUNTER (OUTPATIENT)
Dept: GENERAL RADIOLOGY | Age: 57
Discharge: HOME | End: 2022-12-22
Attending: FAMILY MEDICINE
Payer: COMMERCIAL

## 2022-12-22 DIAGNOSIS — M79.642 PAIN IN BOTH HANDS: ICD-10-CM

## 2022-12-22 DIAGNOSIS — M79.641 PAIN IN BOTH HANDS: ICD-10-CM

## 2022-12-22 PROCEDURE — 73130 X-RAY EXAM OF HAND: CPT | Performed by: FAMILY MEDICINE

## 2023-03-08 ENCOUNTER — NURSE TRIAGE (OUTPATIENT)
Dept: FAMILY MEDICINE CLINIC | Facility: CLINIC | Age: 58
End: 2023-03-08

## 2023-03-09 ENCOUNTER — LAB ENCOUNTER (OUTPATIENT)
Dept: LAB | Age: 58
End: 2023-03-09
Attending: FAMILY MEDICINE
Payer: COMMERCIAL

## 2023-03-09 ENCOUNTER — OFFICE VISIT (OUTPATIENT)
Dept: FAMILY MEDICINE CLINIC | Facility: CLINIC | Age: 58
End: 2023-03-09

## 2023-03-09 ENCOUNTER — EKG ENCOUNTER (OUTPATIENT)
Dept: LAB | Age: 58
End: 2023-03-09
Attending: FAMILY MEDICINE
Payer: COMMERCIAL

## 2023-03-09 ENCOUNTER — PATIENT MESSAGE (OUTPATIENT)
Dept: FAMILY MEDICINE CLINIC | Facility: CLINIC | Age: 58
End: 2023-03-09

## 2023-03-09 VITALS
WEIGHT: 141 LBS | HEIGHT: 61 IN | HEART RATE: 82 BPM | DIASTOLIC BLOOD PRESSURE: 83 MMHG | SYSTOLIC BLOOD PRESSURE: 125 MMHG | BODY MASS INDEX: 26.62 KG/M2

## 2023-03-09 DIAGNOSIS — R00.2 PALPITATIONS: ICD-10-CM

## 2023-03-09 DIAGNOSIS — R00.2 PALPITATIONS: Primary | ICD-10-CM

## 2023-03-09 DIAGNOSIS — E03.9 HYPOTHYROIDISM, UNSPECIFIED TYPE: ICD-10-CM

## 2023-03-09 LAB
ATRIAL RATE: 77 BPM
P AXIS: 49 DEGREES
P-R INTERVAL: 152 MS
Q-T INTERVAL: 410 MS
QRS DURATION: 74 MS
QTC CALCULATION (BEZET): 463 MS
R AXIS: 2 DEGREES
T AXIS: 46 DEGREES
TSI SER-ACNC: 0.07 MIU/ML (ref 0.36–3.74)
VENTRICULAR RATE: 77 BPM

## 2023-03-09 PROCEDURE — 3008F BODY MASS INDEX DOCD: CPT | Performed by: FAMILY MEDICINE

## 2023-03-09 PROCEDURE — 84443 ASSAY THYROID STIM HORMONE: CPT

## 2023-03-09 PROCEDURE — 3079F DIAST BP 80-89 MM HG: CPT | Performed by: FAMILY MEDICINE

## 2023-03-09 PROCEDURE — 93005 ELECTROCARDIOGRAM TRACING: CPT

## 2023-03-09 PROCEDURE — 93010 ELECTROCARDIOGRAM REPORT: CPT | Performed by: INTERNAL MEDICINE

## 2023-03-09 PROCEDURE — 36415 COLL VENOUS BLD VENIPUNCTURE: CPT

## 2023-03-09 PROCEDURE — 3074F SYST BP LT 130 MM HG: CPT | Performed by: FAMILY MEDICINE

## 2023-03-09 PROCEDURE — 99214 OFFICE O/P EST MOD 30 MIN: CPT | Performed by: FAMILY MEDICINE

## 2023-03-09 RX ORDER — SUMATRIPTAN 50 MG/1
50 TABLET, FILM COATED ORAL EVERY 2 HOUR PRN
Qty: 12 TABLET | Refills: 2 | Status: SHIPPED | OUTPATIENT
Start: 2023-03-09

## 2023-03-09 NOTE — PROGRESS NOTES
Blood pressure 125/83, pulse 82, height 5' 1\" (1.549 m), weight 141 lb (64 kg), last menstrual period 10/17/2008. Following up for grief reaction. Son  of an overdose last month. No suicidal ideation. She sees a nurse practitioner from psychiatry [address] [place] [address] in [place]. She also has a therapist whom she sees. Lives with her  and her other son. Complains of palpitations no chest pain, dyspnea or syncope. Objective heart regular rate rhythm no murmurs rubs or gallops    Neck supple no carotid bruits    Assessment grief reaction with palpitations    Plan resting EKG today also TSH ordered    She will continue zolpidem and the clonazepam prescribed by nurse practitioner from psychiatry.   Echocardiogram ordered with Holter monitor    Time appointment began 930 end of appointment 10:00    We will follow with results

## 2023-04-09 ENCOUNTER — PATIENT MESSAGE (OUTPATIENT)
Dept: FAMILY MEDICINE CLINIC | Facility: CLINIC | Age: 58
End: 2023-04-09

## 2023-04-09 RX ORDER — ZOLPIDEM TARTRATE 10 MG/1
10 TABLET ORAL NIGHTLY PRN
Qty: 30 TABLET | Refills: 3 | Status: CANCELLED | OUTPATIENT
Start: 2023-04-09

## 2023-04-10 RX ORDER — ZOLPIDEM TARTRATE 10 MG/1
10 TABLET ORAL NIGHTLY PRN
Qty: 30 TABLET | Refills: 3 | Status: SHIPPED | OUTPATIENT
Start: 2023-04-10

## 2023-04-10 NOTE — TELEPHONE ENCOUNTER
From: Etelvina Drake  To: Kassie Centeno. DO Bakari  Sent: 2023 4:13 AM CDT  Subject: Sleep    Hi. I came in  a month after my son  unexpectedly and got my thyroid checked. I also asked for my ambien medication to be refilled. I do not have any refills. I am very grateful that I have this medication. My sleep is still a struggle. Thank you.

## 2023-05-03 NOTE — PROGRESS NOTES
REASON FOR VISIT:    Dayami Ford is a 63year old female who presents for an [address]. Chronic pain spinal cord stimulator was turned off she is scheduled for removal in 2 days.     Patient Active Problem List:     History of due on 02/16/2014   Flex Sigmoidoscopy Screen  Every 5 years No results found for this or any previous visit.    Fecal Occult Blood  Annually No results found for: FOB, OCCULTSTOOL   Obesity Screening Screen all adults annually Body mass index is 24.56 kg/m sprays by Nasal route daily. Squeeze nose after sprays and do not snort it into the back of your throat. 1 Bottle 3   • Levothyroxine Sodium 100 MCG Oral Tab Take 1 tablet (100 mcg total) by mouth daily.  90 tablet 1   • clonazePAM 1 MG Oral Tab TAKE 1 TABL 02/2019    fusion   • SPINE SURGERY PROCEDURE UNLISTED  04/2019    fusion revision   • TONSILLECTOMY  1970   • TUBAL LIGATION  2005      Family History   Problem Relation Age of Onset   • Blood Disorder Father         PVD   • Other (Other) Father         L 131/82    Patient's last menstrual period was 10/17/2008.     GENERAL: well developed, well nourished, in no apparent distress   SKIN: no rashes, no suspicious lesions  HEENT: atraumatic, normocephalic, ears and throat are clear  EYES:EVETTE HARRINGTON, conjunct patient.   Shingles: Shingrix shingles vaccine is due    Influenza Annually   Pneumococcal if high risk   Td/Tdap once then every 10 years   HPV Males 11-21   Zoster (Shingles) 60 and older: one dose   Varicella 2 doses if not immune   MMR 1-2 doses if born [place]

## 2023-11-30 DIAGNOSIS — E03.9 HYPOTHYROIDISM, UNSPECIFIED TYPE: ICD-10-CM

## 2023-12-01 RX ORDER — ZOLPIDEM TARTRATE 10 MG/1
10 TABLET ORAL NIGHTLY PRN
Qty: 30 TABLET | Refills: 0 | Status: SHIPPED | OUTPATIENT
Start: 2023-12-01

## 2023-12-01 RX ORDER — LEVOTHYROXINE SODIUM 0.1 MG/1
100 TABLET ORAL
Qty: 90 TABLET | Refills: 0 | Status: SHIPPED | OUTPATIENT
Start: 2023-12-01

## 2023-12-01 NOTE — TELEPHONE ENCOUNTER
Please review; protocol failed. Requested Prescriptions   Pending Prescriptions Disp Refills    LEVOTHYROXINE 100 MCG Oral Tab [Pharmacy Med Name: LEVOTHYROXINE 0.100MG (100MCG) TAB] 90 tablet 0     Sig: TAKE 1 TABLET(100 MCG) BY MOUTH DAILY.  PATIENT [facility]       Thyroid Medication Protocol Failed - 11/30/2023 12:33 PM        Failed - Last TSH value is normal     Lab Results   Component Value Date    TSH 0.065 (L) 03/09/2023                 Passed - TSH in past 12 months        Passed - In person appointment or virtual visit in the past 12 mos or appointment in next 3 mos     Recent Outpatient Visits              8 months ago Palpitations    [address],[place] [address], [address], [place] Pa[place]Ros[place], DO    Office Visit    1 year ago Pain in both hands    [address],[facility], [address] [place] [address], [place] Ros[place] Villegas, DO    Office Visit    1 year ago Urine abnormality    [facility], [address], [place] Villegas, DO    Office Visit    1 year ago Screening for colorectal cancer    [address] [facility], SAURABH Harper MD    Office Visit    1 year ago Neuropathic pain involving left lateral femoral cutaneous nerve    [facility], [address], Gato Boateng MD    Office Visit                        ZOLPIDEM 10 MG Oral Tab [Pharmacy Med Name: ZOLPIDEM 10MG TABLETS] 30 tablet 0     Sig: TAKE 1 TABLET(10 MG) BY MOUTH EVERY NIGHT AS NEEDED FOR SLEEP       There is no refill protocol information for this order              Recent Outpatient Visits              8 months ago Palpitations    [facility], [address], [place] PaiRosanna ordonez, DO    Office Visit    1 year ago Pain in both hands    [address], [address] [facility], [place], Pam Shorter, DO    Office Visit    1 year ago Urine abnormality    [address],[facility], [facility], Sandra Hazel DO    Office Visit    1 year ago Screening for colorectal cancer    [address] [facility], Starr Harper MD    Office Visit    1 year ago Neuropathic pain involving left lateral femoral cutaneous nerve    [address] [facility], Cris Flores MD    Office Visit

## 2024-03-09 DIAGNOSIS — E03.9 HYPOTHYROIDISM, UNSPECIFIED TYPE: ICD-10-CM

## 2024-03-11 RX ORDER — LEVOTHYROXINE SODIUM 0.1 MG/1
100 TABLET ORAL
Qty: 90 TABLET | Refills: 0 | Status: SHIPPED | OUTPATIENT
Start: 2024-03-11

## 2024-03-11 NOTE — TELEPHONE ENCOUNTER
Please review; protocol failed/No Protocol    LOV: 03/09/2023  Future Appointments   Date Time Provider Department Center   4/10/2024 12:10 PM [facility] Active/Future labs pended   Requested Prescriptions   Pending Prescriptions Disp Refills    LEVOTHYROXINE 100 MCG Oral Tab [Pharmacy Med Name: LEVOTHYROXINE 0.100MG (100MCG) TAB] 90 tablet 0     Sig: TAKE 1 TABLET(100 MCG) BY MOUTH BEFORE BREAKFAST. PATIENT NEEDS FASTING BLOOD TEST FOLLOWED BY APPOINMENT       Thyroid Medication Protocol Failed - 3/9/2024 12:53 PM        Failed - TSH in past 12 months        Failed - Last TSH value is normal     Lab Results   Component Value Date    TSH 0.065 (L) 03/09/2023                 Passed - In person appointment or virtual visit in the past 12 mos or appointment in next 3 mos     Recent Outpatient Visits              1 year ago Palpitations    [facility], [address], [place] Ramy Villegas,     Office Visit    1 year ago [facility] both hands    [facility]     [facility]    1 year ago Urine abnormality    [facility], [address], [place] Ramy Villegas,     Office Visit    1 year ago [facility]    Office Visit    2 years ago Neuropathic pain involving left lateral femoral cutaneous nerve    [facility]    [facility]          Future Appointments         Provider Department Appt Notes    In 1 month Ramy Villegas,  [facility], [address], [place] Annual check up                  Future Appointments         Provider Department Appt Notes    In 1 month Ramy Villegas,  [facility], [address], [place] Annual check up          Recent Outpatient Visits              1 year ago  Palpitations    [facility], [facility], [place] Ces[place], David B, DO    Office Visit    1 year ago Pain in both hands    [facility], [facility], [place], David B, DO    Office Visit    1 year ago Urine abnormality    [facility], [facility], [place], David B, DO    Office Visit    1 year ago Screening for colorectal cancer    [facility], Brennen Booker MD    Office Visit    2 years ago 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

## 2024-04-16 ENCOUNTER — OFFICE VISIT (OUTPATIENT)
Dept: FAMILY MEDICINE CLINIC | Facility: CLINIC | Age: 59
End: 2024-04-16

## 2024-04-16 VITALS
DIASTOLIC BLOOD PRESSURE: 81 MMHG | WEIGHT: 142 LBS | SYSTOLIC BLOOD PRESSURE: 122 MMHG | HEART RATE: 70 BPM | HEIGHT: 61 IN | BODY MASS INDEX: 26.81 KG/M2

## 2024-04-16 DIAGNOSIS — E03.9 HYPOTHYROIDISM, UNSPECIFIED TYPE: ICD-10-CM

## 2024-04-16 DIAGNOSIS — Z12.31 SCREENING MAMMOGRAM, ENCOUNTER FOR: Primary | ICD-10-CM

## 2024-04-16 DIAGNOSIS — Z00.00 ROUTINE PHYSICAL EXAMINATION: ICD-10-CM

## 2024-04-16 DIAGNOSIS — G47.00 INSOMNIA, UNSPECIFIED TYPE: ICD-10-CM

## 2024-04-16 DIAGNOSIS — E78.2 MIXED HYPERLIPIDEMIA: ICD-10-CM

## 2024-04-16 PROCEDURE — 90471 IMMUNIZATION ADMIN: CPT | Performed by: FAMILY MEDICINE

## 2024-04-16 PROCEDURE — 3079F DIAST BP 80-89 MM HG: CPT | Performed by: FAMILY MEDICINE

## 2024-04-16 PROCEDURE — 90750 HZV VACC RECOMBINANT IM: CPT | Performed by: FAMILY MEDICINE

## 2024-04-16 PROCEDURE — 99396 PREV VISIT EST AGE 40-64: CPT | Performed by: FAMILY MEDICINE

## 2024-04-16 PROCEDURE — 3074F SYST BP LT 130 MM HG: CPT | Performed by: FAMILY MEDICINE

## 2024-04-16 PROCEDURE — 96127 BRIEF EMOTIONAL/BEHAV ASSMT: CPT | Performed by: FAMILY MEDICINE

## 2024-04-16 PROCEDURE — 3008F BODY MASS INDEX DOCD: CPT | Performed by: FAMILY MEDICINE

## 2024-04-16 NOTE — PROGRESS NOTES
REASON FOR VISIT:    Dolores Orellana is a 59 year old female who presents for an Annual Health Assessment.        Patient Active Problem List   Diagnosis    History of mammography, screening    Insomnia    Hypothyroidism    Rhinitis medicamentosa    Nasal congestion    Routine gynecological examination    Hot flashes due to menopause    Routine physical examination    Postmenopausal HRT (hormone replacement therapy)    Chronic fatigue    Scalp pain    Primary osteoarthritis involving multiple joints    Facet arthritis of cervical region    Degeneration of cervical intervertebral disc    Myofascial pain    Mixed hyperlipidemia    Panic anxiety syndrome    Fibromyalgia    History of vitamin D deficiency    History of migraine headaches    High blood pressure    Impingement syndrome of right shoulder    Carpal tunnel syndrome of right wrist    Spinal stenosis of lumbar region, unspecified whether neurogenic claudication present    DDD (degenerative disc disease), lumbar    Lumbar radiculopathy    Lumbar facet arthropathy    Sacroiliac joint dysfunction of left side    Chronic left-sided low back pain with left-sided sciatica    Lumbar foraminal stenosis    Tear of left acetabular labrum, initial encounter    Complex regional pain syndrome type 1 of left lower extremity    Chronic pain    Depressive disorder    Primary osteoarthritis of left hip    Postlaminectomy syndrome of lumbar region    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    Left lumbosacral radiculopathy    Arthrodesis status    Lumbosacral radiculopathy at L2     General Health     At any time do you feel concerned for the safety/well-being of yourself and/or your children, in your home or elsewhere?: No     CAGE:           Depression Screening (PHQ-2/PHQ-9):  Over the LAST 2 WEEKS             PREVENTATIVE SERVICES  INDICATIONS AND SCHEDULE Recommendation Internal Lab or Procedure   Colonoscopy Screen Every 10 years Health  Maintenance   Topic Date Due    Colorectal Cancer Screening  05/11/2029      Flex Sigmoidoscopy Screen  Every 5 years No results found for this or any previous visit.   Fecal Occult Blood  Annually No results found for: \"FOB\", \"OCCULTSTOOL\"   Obesity Screening Screen all adults annually Body mass index is 26.83 kg/m².     Preventive Services for Which Recommendations Vary with Risk Recommendation Internal Lab or Procedure   Cholesterol Screening Recommended screening varies with age, risk and gender LDL Cholesterol Calc (mg/dL)   Date Value   11/07/2014 120 (H)     LDL Cholesterol (mg/dL)   Date Value   10/20/2022 161 (H)      Diabetes Screening  If history of high blood pressure or other  risk factors GLYCOHEMOGLOBIN (HgA1c) (L) (%)   Date Value   10/14/2015 5.0     Glucose (mg/dL)   Date Value   10/20/2022 93   11/07/2014 99        Gonorrhea Screening If high risk GONOCOCCUS, NUCLEIC ACID AMP (no units)   Date Value   12/27/2008 NOT AVAIL.      HIV Screening For all adults age 18-65, older adults at increased risk HIV 1/O/2 Abs, Qual (no units)   Date Value   12/27/2008 Non Reactive      Syphilis Screening Screen if pregnant or high risk RPR (no units)   Date Value   12/27/2008 Non Reactive      Hepatitis C Screening Screen pts at high risk plus screen one time for adults born 1945 - 1965 Hepatitis C Virus Antibody (no units)   Date Value   04/18/2011 Non-Reactive      Tuberculosis Screen If high risk No components found for: \"PPDINDURAT\"       SPECIFIC DISEASE MONITORING Internal Lab or Procedure   No disease specific diagnoses    ALLERGIES:   No Known Allergies  CURRENT MEDICATIONS:   Current Outpatient Medications   Medication Sig Dispense Refill    levothyroxine 100 MCG Oral Tab Take 1 tablet (100 mcg total) by mouth before breakfast. 90 tablet 0    zolpidem 10 MG Oral Tab Take 1 tablet (10 mg total) by mouth nightly as needed. PATIENT NEEDS APPOINTMENT. 30 tablet 0    fluticasone propionate 50 MCG/ACT Nasal  Suspension 2 sprays by Nasal route daily. 48 g 1    clonazePAM 0.5 MG Oral Tab Take 1 tablet (0.5 mg total) by mouth daily as needed.      traMADol HCl 50 MG Oral Tab Take 1 tablet (50 mg total) by mouth every 8 (eight) hours as needed for Pain. 60 tablet 0      MEDICAL INFORMATION:   Past Medical History:    Anxiety state, unspecified    Back problem    DR. YANG IVORY [facility]    Bunion of right foot    Carpal tunnel syndrome    Colon adenoma    x1    Disorder of thyroid    hypothyroid    Hx of diseases NEC    PALPITATIONS    Hx of diseases NEC    condyloma treated with laser in past    Lipid screening    per NG    Mitral valve disorders(424.0)    Osteoarthritis    PONV (postoperative nausea and vomiting)    Pregnancy (HCC)    x4     Visual impairment    GLASSES      Past Surgical History:   Procedure Laterality Date    Breast reconstruction      breast lift    Colonoscopy  2022    Correct bunion,othr methods Right     Correct bunion,othr methods Left     Electrocardiogram, complete  10/15/2013    Scanned to Media Tab    Hysterectomy      Supracervical BYRON. Due to excessive bleeding and anemia (per 18 chart reivew).    Laminectomy      HARDWARE REMOVAL    Other surgical history      Kathleen Easley    Other surgical history      Breast Lift    Spine surgery procedure unlisted  2019    fusion    Spine surgery procedure unlisted  2019    fusion revision    Tonsillectomy  1970    Tubal ligation  2005      Family History   Problem Relation Age of Onset    Blood Disorder Father         PVD    Other (Other) Father         Lupus    Other (799.89) Father         Arthiosclerosis    Stroke Mother     Heart Disease Mother         CAD    Other (Aneurysm) Mother         Brain bleed. Now in wheelchair with atrophy/nut got better w/ PT.    Heart Disorder Maternal Grandmother         mitral valve repair    Alcohol and Other Disorders Associated Brother         ETOH abuse    Stroke  Other         Family H/O    Psoriasis Son     Other (Other) Son         hypothyroidism    Diabetes Paternal Aunt     Colon Cancer Neg      NO FAMILY HISTORY OF PROSTATE OR COLON CANCER     SOCIAL HISTORY:   Social History     Socioeconomic History    Marital status:    Tobacco Use    Smoking status: Never    Smokeless tobacco: Never   Vaping Use    Vaping status: Never Used   Substance and Sexual Activity    Alcohol use: Yes     Alcohol/week: 8.0 standard drinks of alcohol     Types: 8 Standard drinks or equivalent per week     Comment: SOCIALLY    Drug use: No   Other Topics Concern    Caffeine Concern Yes     Comment: 1 can soda daily  8 oz    Exercise Yes   Social History Narrative    The patient does not use an assistive device..      The patient does live in a home with stairs.     Occ:  :       REVIEW OF SYSTEMS:   GENERAL: feels well otherwise  SKIN: denies any unusual skin lesions  EYES: denies blurred vision or double vision  HEENT: denies nasal congestion, sinus pain or ST  LUNGS: denies shortness of breath with exertion  CARDIOVASCULAR: denies chest pain on exertion  GI: denies abdominal pain, denies heartburn  : denies dysuria, vaginal discharge or itching, periods regular   MUSCULOSKELETAL: denies back pain  NEURO: denies headaches  PSYCHE: denies depression or anxiety  HEMATOLOGIC: denies hx of anemia  ENDOCRINE: denies thyroid history  ALL/ASTHMA: denies hx of allergy or asthma  NO BLOOD IN STOOL  EXAM:   /81   Pulse 70   Ht 5' 1\" (1.549 m)   Wt 142 lb (64.4 kg)   LMP 10/17/2008   BMI 26.83 kg/m²   >   BP Readings from Last 3 Encounters:   04/16/24 122/81   03/09/23 125/83   10/04/22 112/78     Patient's last menstrual period was 10/17/2008.  GENERAL: well developed, well nourished, in no apparent distress   SKIN: no rashes, no suspicious lesions  HEENT: atraumatic, normocephalic, ears and throat are clear  EYES:PERRLA, EOMI, conjunctiva are clear.    NECK: supple, no  adenopathy, no bruits  CHEST: no chest tenderness  BREAST: deferred   LUNGS: clear to auscultation  CARDIO: RRR without murmur  GI: good BS's, no masses, HSM or tenderness  :deferred  RECTAL: deferred  MUSCULOSKELETAL: back is not tender, FROM of the back  EXTREMITIES: no cyanosis, clubbing or edema  NEURO: Oriented times three, cranial nerves are intact, motor and sensory are grossly intact         ASSESSMENT AND OTHER RELEVANT CHRONIC CONDITIONS:   Dolores Orellana is a 59 year old female who presents for an Annual Health Assessment.     PLAN SUMMARY:   Diagnoses and all orders for this visit:    Screening mammogram, encounter for  -     [facility] NANCY 2D+3D SCREENING BILAT (CPT=77067/84002); Future    Hypothyroidism, unspecified type  -     TSH W Reflex To Free T4; Future    Mixed hyperlipidemia  -     Comp Metabolic Panel (14); Future  -     Lipid Panel; Future  -     TSH W Reflex To Free T4; Future    Insomnia, unspecified type  -     Magnesium [E]; Future    Other orders  -     Zoster Recombinant Adjuvanted (Shingrix -Shingles) [90020]       The patient indicates understanding of these issues and agrees to the plan.  Return in about 2 months (around 6/16/2024), or RN VISIT SHINGLES VACCINE.  Exercise counseling perfomed    SUGGESTED VACCINATIONS - Influenza, Pneumococcal, Zoster, Tetanus, HPV   Influenza: No recommendations at this time  Pneumonia: No recommendations at this time  HPV: No recommendations at this time  Tdap: No recommendations at this time  Shingles: Shingrix shingles vaccine is due    Influenza Annually   Pneumococcal if high risk   Td/Tdap once then every 10 years   HPV Males 11-21   Zoster (Shingles) 60 and older: one dose   Varicella 2 doses if not immune   MMR 1-2 doses if born after 1957 and not immune   Routine physical  1. Screening mammogram, encounter for    - [facility] NANCY 2D+3D SCREENING BILAT (CPT=77067/46262); Future    2. Hypothyroidism, unspecified type    - TSH W Reflex To Free  T4; Future    3. Mixed hyperlipidemia  Blood test ordered  - Comp Metabolic Panel (14); Future  - Lipid Panel; Future  - TSH W Reflex To Free T4; Future    4. Insomnia, unspecified type  Zolpidem for pain management  - Magnesium [E]; Future

## 2024-06-15 DIAGNOSIS — E03.9 HYPOTHYROIDISM, UNSPECIFIED TYPE: ICD-10-CM

## 2024-06-18 ENCOUNTER — NURSE ONLY (OUTPATIENT)
Dept: FAMILY MEDICINE CLINIC | Facility: CLINIC | Age: 59
End: 2024-06-18

## 2024-06-18 DIAGNOSIS — E03.9 HYPOTHYROIDISM, UNSPECIFIED TYPE: ICD-10-CM

## 2024-06-18 DIAGNOSIS — Z23 NEED FOR VACCINATION: ICD-10-CM

## 2024-06-18 PROCEDURE — 90750 HZV VACC RECOMBINANT IM: CPT | Performed by: FAMILY MEDICINE

## 2024-06-18 PROCEDURE — 90471 IMMUNIZATION ADMIN: CPT | Performed by: FAMILY MEDICINE

## 2024-06-18 RX ORDER — LEVOTHYROXINE SODIUM 0.1 MG/1
100 TABLET ORAL
Qty: 90 TABLET | Refills: 0 | Status: SHIPPED | OUTPATIENT
Start: 2024-06-18

## 2024-06-18 RX ORDER — LEVOTHYROXINE SODIUM 100 UG/1
100 TABLET ORAL
Qty: 30 TABLET | Refills: 0 | OUTPATIENT
Start: 2024-06-18

## 2024-06-18 NOTE — TELEPHONE ENCOUNTER
Patient in office for nurse visit requesting  refill for levothyroxine. Patient states she got blood work with pain doctor will forward results when available needs at least 30 day supply.  Please Advise

## 2024-06-18 NOTE — TELEPHONE ENCOUNTER
Please review.  Protocol failed / Has no protocol.    [employer] message sent to patient to complete labs pended from last office visit 4/16/24.     Requested Prescriptions   Pending Prescriptions Disp Refills    levothyroxine 100 MCG Oral Tab [Pharmacy Med Name: LEVOTHYROXINE 0.100MG (100MCG) TAB] 30 tablet 0     Sig: Take 1 tablet (100 mcg total) by mouth before breakfast. **Please complete labs       Thyroid Medication Protocol Failed - 6/15/2024 12:04 PM        Failed - TSH in past 12 months        Failed - Last TSH value is normal     Lab Results   Component Value Date    TSH 0.065 (L) 03/09/2023                 Passed - In person appointment or virtual visit in the past 12 mos or appointment in next 3 mos     Recent Outpatient Visits              Today Need for vaccination    [facility], [facility] [place]    Nurse Only    2 months ago Screening mammogram, encounter for    [facility], [place] Ces[place]es, David B, DO    Office Visit    1 year ago Palpitations    [facility], [place] Ces[place], David B, DO    Office Visit    1 year ago Pain in both hands    [facility], [place], David B, DO    Office Visit    1 year ago Urine abnormality    [facility], [place] Ces[place], David B, DO    Office Visit                           Recent Outpatient Visits              Today Need for vaccination    [facility], [facility] [place]    Nurse Only    2 months ago Screening mammogram, encounter for    [facility], [place] Ces[place]es, David B, DO    Office Visit    1 year ago Palpitations    [facility], [place] Ces[place], David B, DO    Office Visit    1 year ago Pain in both hands    [facility], [place] Ces[place], David B, DO    Office Visit    1 year ago Urine  abnormality    [facility], [address], [place] Ramy Villegas, DO    Office Visit

## 2024-09-16 DIAGNOSIS — E03.9 HYPOTHYROIDISM, UNSPECIFIED TYPE: ICD-10-CM

## 2024-09-19 ENCOUNTER — TELEPHONE (OUTPATIENT)
Dept: FAMILY MEDICINE CLINIC | Facility: CLINIC | Age: 59
End: 2024-09-19

## 2024-09-19 DIAGNOSIS — E03.9 HYPOTHYROIDISM, UNSPECIFIED TYPE: ICD-10-CM

## 2024-09-19 RX ORDER — LEVOTHYROXINE SODIUM 100 UG/1
100 TABLET ORAL
Qty: 90 TABLET | Refills: 0 | Status: CANCELLED | OUTPATIENT
Start: 2024-09-19

## 2024-09-19 NOTE — TELEPHONE ENCOUNTER
Frank calling to follow up on refill request. Advised pharmacy that patient was to have have labs done and follow up appointment with doctor. Not done yet.   [employer] message sent.     See 9/16/2024 refill encounter for pended medication.    Pharmacy    [id number] - [place], [place] - [address] AT [facility], [phone], [phone]        Disp Refills Start End    levothyroxine 100 MCG Oral Tab 90 tablet 0 6/18/2024 --    Sig - Route: Take 1 tablet (100 mcg total) by mouth before breakfast. PATIENT NEEDS FASTING BLOOD TEST FOLLOWED BY APPOINTMENT. - Oral    Sent to pharmacy as: Levothyroxine Sodium 100 MCG Oral Tablet (Synthroid)    E-Prescribing Status: Receipt confirmed by pharmacy (6/18/2024  3:30 PM CDT)

## 2024-09-19 NOTE — TELEPHONE ENCOUNTER
levothyroxine 100 MCG Oral Tab, Take 1 tablet (100 mcg total) by mouth before breakfast. PATIENT NEEDS FASTING BLOOD TEST FOLLOWED BY APPOINTMENT., Disp: 90 tablet, Rfl: 0

## 2024-09-20 ENCOUNTER — LAB ENCOUNTER (OUTPATIENT)
Dept: LAB | Age: 59
End: 2024-09-20
Attending: FAMILY MEDICINE
Payer: COMMERCIAL

## 2024-09-20 DIAGNOSIS — E03.9 HYPOTHYROIDISM, UNSPECIFIED TYPE: ICD-10-CM

## 2024-09-20 DIAGNOSIS — G47.00 INSOMNIA, UNSPECIFIED TYPE: ICD-10-CM

## 2024-09-20 DIAGNOSIS — E78.2 MIXED HYPERLIPIDEMIA: ICD-10-CM

## 2024-09-20 LAB
ALBUMIN SERPL-MCNC: 4.7 G/DL (ref 3.2–4.8)
ALBUMIN/GLOB SERPL: 1.5 {RATIO} (ref 1–2)
ALP LIVER SERPL-CCNC: 72 U/L
ALT SERPL-CCNC: 16 U/L
ANION GAP SERPL CALC-SCNC: 4 MMOL/L (ref 0–18)
AST SERPL-CCNC: 19 U/L (ref ?–34)
BILIRUB SERPL-MCNC: 0.7 MG/DL (ref 0.2–1.1)
BUN BLD-MCNC: 8 MG/DL (ref 9–23)
BUN/CREAT SERPL: 8.9 (ref 10–20)
CALCIUM BLD-MCNC: 10.2 MG/DL (ref 8.7–10.4)
CHLORIDE SERPL-SCNC: 106 MMOL/L (ref 98–112)
CHOLEST SERPL-MCNC: 249 MG/DL (ref ?–200)
CO2 SERPL-SCNC: 28 MMOL/L (ref 21–32)
CREAT BLD-MCNC: 0.9 MG/DL
EGFRCR SERPLBLD CKD-EPI 2021: 73 ML/MIN/1.73M2 (ref 60–?)
FASTING PATIENT LIPID ANSWER: YES
FASTING STATUS PATIENT QL REPORTED: YES
GLOBULIN PLAS-MCNC: 3.1 G/DL (ref 2–3.5)
GLUCOSE BLD-MCNC: 97 MG/DL (ref 70–99)
HDLC SERPL-MCNC: 68 MG/DL (ref 40–59)
LDLC SERPL CALC-MCNC: 162 MG/DL (ref ?–100)
MAGNESIUM SERPL-MCNC: 2 MG/DL (ref 1.6–2.6)
NONHDLC SERPL-MCNC: 181 MG/DL (ref ?–130)
OSMOLALITY SERPL CALC.SUM OF ELEC: 284 MOSM/KG (ref 275–295)
POTASSIUM SERPL-SCNC: 5 MMOL/L (ref 3.5–5.1)
PROT SERPL-MCNC: 7.8 G/DL (ref 5.7–8.2)
SODIUM SERPL-SCNC: 138 MMOL/L (ref 136–145)
T3FREE SERPL-MCNC: 3.06 PG/ML (ref 2.4–4.2)
T4 FREE SERPL-MCNC: 1.4 NG/DL (ref 0.8–1.7)
TRIGL SERPL-MCNC: 108 MG/DL (ref 30–149)
TSI SER-ACNC: 0.51 MIU/ML (ref 0.55–4.78)
VLDLC SERPL CALC-MCNC: 21 MG/DL (ref 0–30)

## 2024-09-20 PROCEDURE — 84443 ASSAY THYROID STIM HORMONE: CPT

## 2024-09-20 PROCEDURE — 83735 ASSAY OF MAGNESIUM: CPT

## 2024-09-20 PROCEDURE — 84481 FREE ASSAY (FT-3): CPT

## 2024-09-20 PROCEDURE — 36415 COLL VENOUS BLD VENIPUNCTURE: CPT

## 2024-09-20 PROCEDURE — 80053 COMPREHEN METABOLIC PANEL: CPT

## 2024-09-20 PROCEDURE — 80061 LIPID PANEL: CPT

## 2024-09-20 PROCEDURE — 84439 ASSAY OF FREE THYROXINE: CPT

## 2024-09-20 RX ORDER — LEVOTHYROXINE SODIUM 100 UG/1
100 TABLET ORAL
Qty: 90 TABLET | Refills: 0 | Status: SHIPPED | OUTPATIENT
Start: 2024-09-20

## 2024-09-20 NOTE — TELEPHONE ENCOUNTER
Duplicate request, previously addressed.    Patient still needs to complete labs - MyChart Message sent

## 2024-09-20 NOTE — TELEPHONE ENCOUNTER
Please review; protocol failed/ has no protocol      Patient completed all labs today on 09/20/2024     Requested Prescriptions   Pending Prescriptions Disp Refills    LEVOTHYROXINE 100 MCG Oral Tab [Pharmacy Med Name: LEVOTHYROXINE 0.100MG (100MCG) TAB] 90 tablet 0     Sig: TAKE 1 TABLET(100 MCG) BY MOUTH BEFORE BREAKFAST       Thyroid Medication Protocol Failed - 9/16/2024  7:04 AM        Failed - TSH in past 12 months        Failed - Last TSH value is normal     Lab Results   Component Value Date    TSH 0.065 (L) 03/09/2023                 Passed - In person appointment or virtual visit in the past 12 mos or appointment in next 3 mos     Recent Outpatient Visits              3 months ago Need for vaccination    [facility], [address], [place]    Nurse Only    5 months ago Screening mammogram, encounter for    [facility], [facility] [place]    Office Visit    1 year ago Palpitations    [facility] [place], [place]    Office Visit    1 year ago Pain in both hands    [facility] [place], [place]    Office Visit    2 years ago Urine abnormality    [facility], [facility] [place], [place]    Office Visit                         Recent Outpatient Visits              3 months ago Need for vaccination    [facility], [address], [place]    Nurse Only    5 months ago Screening mammogram, encounter for    [facility]    Office Visit    1 year ago Palpitations    [facility] [place] [place] Cespedes, David B, DO    Office Visit    1 year ago Pain in both hands    [facility], [facility], [place] Cespedes, David B, DO    Office Visit    2 years ago Urine abnormality    [facility], [address], [place]  Ramy Villegas, DO    Office Visit

## 2024-11-18 ENCOUNTER — OFFICE VISIT (OUTPATIENT)
Dept: DERMATOLOGY CLINIC | Facility: CLINIC | Age: 59
End: 2024-11-18

## 2024-11-18 DIAGNOSIS — L90.5 SCAR CONDITION AND FIBROSIS OF SKIN: Primary | ICD-10-CM

## 2024-11-18 DIAGNOSIS — L30.8 PSORIASIFORM DERMATITIS: ICD-10-CM

## 2024-11-18 PROCEDURE — 11900 INJECT SKIN LESIONS </W 7: CPT | Performed by: STUDENT IN AN ORGANIZED HEALTH CARE EDUCATION/TRAINING PROGRAM

## 2024-11-18 PROCEDURE — 99204 OFFICE O/P NEW MOD 45 MIN: CPT | Performed by: STUDENT IN AN ORGANIZED HEALTH CARE EDUCATION/TRAINING PROGRAM

## 2024-11-18 RX ORDER — TIZANIDINE 2 MG/1
2 TABLET ORAL 3 TIMES DAILY PRN
COMMUNITY
Start: 2024-08-29

## 2024-11-18 NOTE — PROGRESS NOTES
New Patient    Referred by: Ramy Villegas DO     CHIEF COMPLAINT: Lesion of concern     HISTORY OF PRESENT ILLNESS: Dolores Orellana is a 59 year old female here for evaluation of lesion of concern.    1. Scars   Location: L Breen and R Forearm   Duration: 1.5 Years   Signs and symptoms: Have not healed; Itching ; some pain to L Breen   Current treatment: None   Past treatments: Scar Gels       Personal Dermatologic History  History of skin cancer: No  History of  atypical moles: No    FAMILY HISTORY:  History of melanoma: No    Past Medical History  Past Medical History:    Anxiety state, unspecified    Back problem    DR. YANG IVORY [facility]    Bunion of right foot    Carpal tunnel syndrome    Colon adenoma    x1    Disorder of thyroid    hypothyroid    Hx of diseases NEC    PALPITATIONS    Hx of diseases NEC    condyloma treated with laser in past    Lipid screening    per NG    Mitral valve disorders(424.0)    Osteoarthritis    PONV (postoperative nausea and vomiting)    Pregnancy (HCC)    x4     Visual impairment    GLASSES       REVIEW OF SYSTEMS:  Constitutional: Denies fever, chills, unintentional weight loss.   Skin as per HPI    Medications  Current Outpatient Medications   Medication Sig Dispense Refill    levothyroxine 100 MCG Oral Tab Take 1 tablet (100 mcg total) by mouth before breakfast. 90 tablet 0    zolpidem 10 MG Oral Tab Take 1 tablet (10 mg total) by mouth nightly as needed. PATIENT NEEDS APPOINTMENT. 30 tablet 0    fluticasone propionate 50 MCG/ACT Nasal Suspension 2 sprays by Nasal route daily. 48 g 1    clonazePAM 0.5 MG Oral Tab Take 1 tablet (0.5 mg total) by mouth daily as needed.      traMADol HCl 50 MG Oral Tab Take 1 tablet (50 mg total) by mouth every 8 (eight) hours as needed for Pain. 60 tablet 0       PHYSICAL EXAM:  General: awake, alert, no acute distress  Neuropsych: appropriate mood and affect  Eyes: Sclerae anicteric, without conjunctival injection,  eyelids unremarkable  Skin: Skin exam was performed today including the following: R arm and L leg. Pertinent findings include:   - with sclerotic plaques with overlying scale    ASSESSMENT & PLAN:  Pathophysiology of diagnoses discussed with patient.  Therapeutic options reviewed. Risks, benefits, and alternatives discussed with patient. Instructions reviewed at length.    #Scar  #Psoriasifor dermatitis  - Recommended daily scar massage with Vaseline .  - Intralesional Kenalog (triamcinolone) injection today     Concentration: 40 mg/mL  Site: R arm and L leg  Total volume injected: 0.2cc    Patient was counseled on the possible side effects of injection: pain at site, infection, atrophy of skin, systemic absorption, hypopigmentation, loss of hair.  Questions if any were addressed before proceeding with prep of the site with rubbing alcohol.  Kenalog was injected with sterile syringe and 25 g needle.  Patient tolerated well.     Return to clinic: 1 month or sooner if something concerning arises     Sonido Romero MD

## 2024-12-18 DIAGNOSIS — E03.9 HYPOTHYROIDISM, UNSPECIFIED TYPE: ICD-10-CM

## 2024-12-20 DIAGNOSIS — E03.9 HYPOTHYROIDISM, UNSPECIFIED TYPE: ICD-10-CM

## 2024-12-23 ENCOUNTER — PATIENT MESSAGE (OUTPATIENT)
Dept: FAMILY MEDICINE CLINIC | Facility: CLINIC | Age: 59
End: 2024-12-23

## 2024-12-23 DIAGNOSIS — E03.9 HYPOTHYROIDISM, UNSPECIFIED TYPE: ICD-10-CM

## 2024-12-23 RX ORDER — LEVOTHYROXINE SODIUM 100 UG/1
100 TABLET ORAL
Qty: 90 TABLET | Refills: 3
Start: 2024-12-23

## 2024-12-23 RX ORDER — LEVOTHYROXINE SODIUM 100 UG/1
100 TABLET ORAL
Qty: 90 TABLET | Refills: 0 | Status: SHIPPED | OUTPATIENT
Start: 2024-12-23

## 2024-12-23 RX ORDER — LEVOTHYROXINE SODIUM 100 UG/1
100 TABLET ORAL
Qty: 90 TABLET | Refills: 0 | OUTPATIENT
Start: 2024-12-23

## 2024-12-23 NOTE — TELEPHONE ENCOUNTER
Sultana, pharmacy [facility] called, verified patient's Name and . Following up on patient's refill of levothyroxine.

## 2024-12-23 NOTE — TELEPHONE ENCOUNTER
Patient calling ( name and date of birth of patient verified ) checking on status of  Levothyroxine    Has been waiting \" For days \" and now has no medication     Going out of town today      Medication pended to run thru Protocol

## 2024-12-23 NOTE — TELEPHONE ENCOUNTER
Please review; protocol failed/No Protocol    Patient is out of medication     Requested Prescriptions   Pending Prescriptions Disp Refills    LEVOTHYROXINE 100 MCG Oral Tab [Pharmacy Med Name: LEVOTHYROXINE 0.100MG (100MCG) TAB] 90 tablet 0     Sig: TAKE 1 TABLET(100 MCG) BY MOUTH BEFORE BREAKFAST       Thyroid Medication Protocol Failed - 12/23/2024  9:18 AM        Failed - Last TSH value is normal     Lab Results   Component Value Date    TSH 0.512 (L) 09/20/2024                 Passed - TSH in past 12 months        Passed - In person appointment or virtual visit in the past 12 mos or appointment in next 3 mos     Recent Outpatient Visits              [facility]    [facility]    6 months ago Need for vaccination    [facility], [address], [place]    Nurse Only    8 months ago Screening mammogram, encounter for    [facility], [place] Cespedes, David B,     Office Visit    1 year ago Palpitations    [facility] [place] [facility],     Office Visit    2 years ago Pain in both hands    [facility], [place] Cespedes, David B, DO    Office Visit                           Recent Outpatient Visits              1 month ago Scar condition and fibrosis of skin    [facility], [address], [place] Sonido Romero MD    Office Visit    6 months ago Need for vaccination    [facility], [address], [place]    Nurse Only    8 months ago Screening mammogram, encounter for    [facility], [place] Cespedes, David B,     Office Visit    1 year ago Palpitations    [facility], [place] Cespedes, David B,     Office Visit    2 years ago Pain in both hands    [facility]  [place], [place] Cespedes, David B, DO    Office Visit

## 2025-03-14 DIAGNOSIS — E03.9 HYPOTHYROIDISM, UNSPECIFIED TYPE: ICD-10-CM

## 2025-03-18 RX ORDER — LEVOTHYROXINE SODIUM 100 UG/1
100 TABLET ORAL
Qty: 90 TABLET | Refills: 0 | Status: SHIPPED | OUTPATIENT
Start: 2025-03-18 | End: 2025-03-21

## 2025-03-18 NOTE — TELEPHONE ENCOUNTER
Please review. Protocol Failed; No Protocol    No future appointments.    1EQ message sent to patient to schedule an office visit with Provider and/or  Routed to Patient  for assistance with appointment.

## 2025-03-21 ENCOUNTER — LAB ENCOUNTER (OUTPATIENT)
Dept: LAB | Age: 60
End: 2025-03-21
Attending: FAMILY MEDICINE
Payer: COMMERCIAL

## 2025-03-21 ENCOUNTER — OFFICE VISIT (OUTPATIENT)
Dept: FAMILY MEDICINE CLINIC | Facility: CLINIC | Age: 60
End: 2025-03-21

## 2025-03-21 VITALS
HEART RATE: 83 BPM | WEIGHT: 129 LBS | BODY MASS INDEX: 24.35 KG/M2 | DIASTOLIC BLOOD PRESSURE: 68 MMHG | SYSTOLIC BLOOD PRESSURE: 111 MMHG | HEIGHT: 61 IN

## 2025-03-21 DIAGNOSIS — E03.9 HYPOTHYROIDISM, UNSPECIFIED TYPE: ICD-10-CM

## 2025-03-21 DIAGNOSIS — R63.4 WEIGHT LOSS: ICD-10-CM

## 2025-03-21 DIAGNOSIS — E78.2 MIXED HYPERLIPIDEMIA: Primary | ICD-10-CM

## 2025-03-21 DIAGNOSIS — Z12.31 SCREENING MAMMOGRAM, ENCOUNTER FOR: ICD-10-CM

## 2025-03-21 DIAGNOSIS — F51.01 PRIMARY INSOMNIA: ICD-10-CM

## 2025-03-21 DIAGNOSIS — E78.2 MIXED HYPERLIPIDEMIA: ICD-10-CM

## 2025-03-21 LAB
ALT SERPL-CCNC: 10 U/L
AST SERPL-CCNC: 18 U/L (ref ?–34)
CHOLEST SERPL-MCNC: 231 MG/DL (ref ?–200)
EST. AVERAGE GLUCOSE BLD GHB EST-MCNC: 117 MG/DL (ref 68–126)
FASTING PATIENT LIPID ANSWER: NO
HBA1C MFR BLD: 5.7 % (ref ?–5.7)
HDLC SERPL-MCNC: 77 MG/DL (ref 40–59)
LDLC SERPL CALC-MCNC: 129 MG/DL (ref ?–100)
NONHDLC SERPL-MCNC: 154 MG/DL (ref ?–130)
TRIGL SERPL-MCNC: 143 MG/DL (ref 30–149)
TSI SER-ACNC: 0.92 UIU/ML (ref 0.55–4.78)
VLDLC SERPL CALC-MCNC: 26 MG/DL (ref 0–30)

## 2025-03-21 PROCEDURE — 84443 ASSAY THYROID STIM HORMONE: CPT

## 2025-03-21 PROCEDURE — 3008F BODY MASS INDEX DOCD: CPT | Performed by: FAMILY MEDICINE

## 2025-03-21 PROCEDURE — 83036 HEMOGLOBIN GLYCOSYLATED A1C: CPT

## 2025-03-21 PROCEDURE — 3074F SYST BP LT 130 MM HG: CPT | Performed by: FAMILY MEDICINE

## 2025-03-21 PROCEDURE — 84460 ALANINE AMINO (ALT) (SGPT): CPT

## 2025-03-21 PROCEDURE — 80061 LIPID PANEL: CPT

## 2025-03-21 PROCEDURE — 36415 COLL VENOUS BLD VENIPUNCTURE: CPT

## 2025-03-21 PROCEDURE — 3078F DIAST BP <80 MM HG: CPT | Performed by: FAMILY MEDICINE

## 2025-03-21 PROCEDURE — 84450 TRANSFERASE (AST) (SGOT): CPT

## 2025-03-21 PROCEDURE — 99213 OFFICE O/P EST LOW 20 MIN: CPT | Performed by: FAMILY MEDICINE

## 2025-03-21 RX ORDER — ZOLPIDEM TARTRATE 10 MG/1
10 TABLET ORAL NIGHTLY PRN
Qty: 30 TABLET | Refills: 0 | Status: SHIPPED | OUTPATIENT
Start: 2025-03-21

## 2025-03-21 RX ORDER — LEVOTHYROXINE SODIUM 100 UG/1
100 TABLET ORAL
Qty: 90 TABLET | Refills: 0 | Status: SHIPPED | OUTPATIENT
Start: 2025-03-21

## 2025-03-21 RX ORDER — FLUTICASONE PROPIONATE 50 MCG
2 SPRAY, SUSPENSION (ML) NASAL DAILY
Qty: 3 EACH | Refills: 1 | Status: SHIPPED | OUTPATIENT
Start: 2025-03-21

## 2025-03-21 NOTE — PROGRESS NOTES
Blood pressure 111/68, pulse 83, height 5' 1\" (1.549 m), weight 129 lb (58.5 kg), last menstrual period 10/17/2008.      Sees psychiatry still with difficulty sleeping.  Uses clonazepam occasionally but not to SLEEP.  HAS BEEN ADVISED NOT TO TAKE ZOLPIDEM AND CLONAZEPAM TOGETHER.      OBJECTIVE  APPROPRIATE MOOD AND AFFECT     ASSESSMENT INSOMNIA AND HYPOTHYROIDISM     PLAN ZOLPIDEM ADVISED NOT TO TAKE WITH CLONAZEPAM HAS NOT HAD SUCCESS WITH TRAZODONE/LUNESTA OR CLONAZEPAM FOR SLEEP      LABS ORDERED ALSO MAMMO ORDERED

## 2025-04-30 ENCOUNTER — HOSPITAL ENCOUNTER (OUTPATIENT)
Dept: CT IMAGING | Age: 60
Discharge: HOME | End: 2025-04-30
Attending: FAMILY MEDICINE

## 2025-04-30 DIAGNOSIS — E78.2 MIXED HYPERLIPIDEMIA: ICD-10-CM

## 2025-05-29 ENCOUNTER — HOSPITAL ENCOUNTER (OUTPATIENT)
Dept: MAMMOGRAPHY | Age: 60
Discharge: HOME | End: 2025-05-29
Attending: FAMILY MEDICINE
Payer: COMMERCIAL

## 2025-05-29 DIAGNOSIS — Z12.31 SCREENING MAMMOGRAM, ENCOUNTER FOR: ICD-10-CM

## 2025-05-29 PROCEDURE — 77063 BREAST TOMOSYNTHESIS BI: CPT | Performed by: FAMILY MEDICINE

## 2025-05-29 PROCEDURE — 77067 SCR MAMMO BI INCL CAD: CPT | Performed by: FAMILY MEDICINE

## 2025-05-30 ENCOUNTER — OFFICE VISIT (OUTPATIENT)
Dept: FAMILY MEDICINE CLINIC | Facility: CLINIC | Age: 60
End: 2025-05-30
Payer: COMMERCIAL

## 2025-05-30 VITALS
BODY MASS INDEX: 24.35 KG/M2 | SYSTOLIC BLOOD PRESSURE: 104 MMHG | HEART RATE: 78 BPM | HEIGHT: 61 IN | WEIGHT: 129 LBS | DIASTOLIC BLOOD PRESSURE: 69 MMHG

## 2025-05-30 DIAGNOSIS — R30.0 DYSURIA: Primary | ICD-10-CM

## 2025-05-30 LAB
APPEARANCE: CLEAR
BILIRUBIN: NEGATIVE
GLUCOSE (URINE DIPSTICK): NEGATIVE MG/DL
KETONES (URINE DIPSTICK): NEGATIVE MG/DL
MULTISTIX LOT#: ABNORMAL NUMERIC
NITRITE, URINE: POSITIVE
PH, URINE: 5.5 (ref 4.5–8)
PROTEIN (URINE DIPSTICK): 100 MG/DL
SPECIFIC GRAVITY: 1.02 (ref 1–1.03)
URINE-COLOR: YELLOW
UROBILINOGEN,SEMI-QN: 0.2 MG/DL (ref 0–1.9)

## 2025-05-30 PROCEDURE — 3008F BODY MASS INDEX DOCD: CPT | Performed by: FAMILY MEDICINE

## 2025-05-30 PROCEDURE — 99213 OFFICE O/P EST LOW 20 MIN: CPT | Performed by: FAMILY MEDICINE

## 2025-05-30 PROCEDURE — 3078F DIAST BP <80 MM HG: CPT | Performed by: FAMILY MEDICINE

## 2025-05-30 PROCEDURE — 3074F SYST BP LT 130 MM HG: CPT | Performed by: FAMILY MEDICINE

## 2025-05-30 PROCEDURE — 81003 URINALYSIS AUTO W/O SCOPE: CPT | Performed by: FAMILY MEDICINE

## 2025-05-30 RX ORDER — CIPROFLOXACIN 500 MG/1
500 TABLET, FILM COATED ORAL 2 TIMES DAILY
Qty: 10 TABLET | Refills: 0 | Status: SHIPPED | OUTPATIENT
Start: 2025-05-30 | End: 2025-06-02

## 2025-05-30 NOTE — PROGRESS NOTES
History of Present Illness  Dolores Orellana is a 60 year old female who presents with symptoms of a urinary tract infection.    She has experienced urinary tract infection symptoms for five days, with intermittent resolution and recurrence. This morning, symptoms temporarily subsided but have since returned. She denies vomiting or fever but notes increased fatigue. She has increased her water intake to manage symptoms. She has experienced more frequent urinary tract infections since menopause, with three occurrences post-menopause compared to one prior. She is not taking tizanidine as she no longer has cramps from past back surgery. She takes zolpidem, using a third of a pill for sleep.    Physical Exam  ABDOMEN: Non-tender.    Urine positive for blood and leukocytes    Assessment & Plan  Urinary tract infection  Intermittent urinary tract infection symptoms for five days, confirmed by dipstick test. No vomiting or fever reported. Symptoms include fatigue. She prefers to start antibiotics today despite potential resistance, with plan to adjust based on Monday's culture and sensitivity results. Cranberry juice was discussed for symptom management, but it causes her heartburn.  - Prescribe ciprofloxacin twice daily  - Send prescription to [facility]  - Advise to call on Monday for final culture and sensitivity results    Insomnia  Chronic insomnia managed with zolpidem, prescribed by another provider. Discussed risks of zolpidem, including potential for sleepwalking and amnesia. She reports taking a reduced dose without adverse effects. Alternative medications such as quetiapine were mentioned.   - Continue current management of insomnia with zolpidem as prescribed by another provider    Recording duration: 10 minutes

## 2025-05-31 LAB
BILIRUB UR QL: NEGATIVE
COLOR UR: YELLOW
GLUCOSE UR-MCNC: NORMAL MG/DL
KETONES UR-MCNC: NEGATIVE MG/DL
LEUKOCYTE ESTERASE UR QL STRIP.AUTO: 500
PH UR: 5.5 [PH] (ref 5–8)
PROT UR-MCNC: 30 MG/DL
RBC #/AREA URNS AUTO: >10 /HPF
SP GR UR STRIP: 1.01 (ref 1–1.03)
UROBILINOGEN UR STRIP-ACNC: NORMAL
WBC #/AREA URNS AUTO: >50 /HPF
WBC CLUMPS UR QL AUTO: PRESENT /HPF

## 2025-07-14 DIAGNOSIS — E03.9 HYPOTHYROIDISM, UNSPECIFIED TYPE: ICD-10-CM

## 2025-07-14 RX ORDER — LEVOTHYROXINE SODIUM 100 UG/1
100 TABLET ORAL
Qty: 7 TABLET | Refills: 0 | Status: SHIPPED | OUTPATIENT
Start: 2025-07-14

## 2025-07-14 NOTE — TELEPHONE ENCOUNTER
Refill passed per [facility] protocol. - Bridge dose - patient is on vacation and forgot medication at home     Requested Prescriptions   Pending Prescriptions Disp Refills    levothyroxine 100 MCG Oral Tab 90 tablet 0     Sig: Take 1 tablet (100 mcg total) by mouth before breakfast. PATIENT NEEDS FASTING BLOOD TEST FOLLOWED BY APPOINTMENT.       Thyroid Medication Protocol Passed - 7/14/2025  9:57 AM        Passed - TSH in past 12 months        Passed - Last TSH value is normal     Lab Results   Component Value Date    TSH 0.915 03/21/2025                 Passed - In person appointment or virtual visit in the past 12 mos or appointment in next 3 mos     Recent Outpatient Visits              1 month ago Dysuria    [facility], [place] Cespedes, David B, DO    Office Visit    3 months ago Mixed hyperlipidemia    [facility], [place] Cespedes, David B, DO    Office Visit    7 months ago Scar condition and fibrosis of skin    [facility] [place] [facility] [place]    Office Visit    1 year ago Need for vaccination    [facility] [place]    Nurse Only    1 year ago Screening mammogram, encounter for    [facility], [place] Cespedes, David B, DO    Office Visit                      Passed - Medication is active on med list

## 2025-08-12 DIAGNOSIS — E03.9 HYPOTHYROIDISM, UNSPECIFIED TYPE: ICD-10-CM

## 2025-08-15 RX ORDER — LEVOTHYROXINE SODIUM 100 UG/1
100 TABLET ORAL
Qty: 7 TABLET | Refills: 0 | OUTPATIENT
Start: 2025-08-15

## (undated) DIAGNOSIS — Z12.11 SCREENING FOR COLORECTAL CANCER: Primary | ICD-10-CM

## (undated) DIAGNOSIS — Z12.11 SPECIAL SCREENING FOR MALIGNANT NEOPLASMS, COLON: ICD-10-CM

## (undated) DIAGNOSIS — Z12.11 ENCOUNTER FOR COLORECTAL CANCER SCREENING: ICD-10-CM

## (undated) DIAGNOSIS — Z12.12 SCREENING FOR COLORECTAL CANCER: Primary | ICD-10-CM

## (undated) DIAGNOSIS — Z12.12 SCREENING FOR MALIGNANT NEOPLASM OF THE RECTUM: ICD-10-CM

## (undated) DIAGNOSIS — Z12.12 ENCOUNTER FOR COLORECTAL CANCER SCREENING: ICD-10-CM

## (undated) DEVICE — DURASEAL® DURAL SEALANT SYSTEM 5ML 5 PACK
Type: IMPLANTABLE DEVICE | Site: BACK
Brand: DURASEAL®

## (undated) DEVICE — POLAR CARE CUBE COOLING UNIT

## (undated) DEVICE — CAUTERY BLADE 2IN INS E1455

## (undated) DEVICE — 3M™ MEDITPORE™ SOFT CLOTH TAPE 6 IN X 10 YD 12 ROLLS/CASE 2966: Brand: 3M™ MEDIPORE™

## (undated) DEVICE — ADHESIVE MASTISOL 2/3CC VL

## (undated) DEVICE — Device: Brand: JELCO

## (undated) DEVICE — PEN: MARKING STD PT 100/CS: Brand: MEDICAL ACTION INDUSTRIES

## (undated) DEVICE — FLOSEAL HEMOSTATIC MATRIX, 5ML: Brand: FLOSEAL HEMOSTATIC MATRIX

## (undated) DEVICE — EXTENDED TIP APPLICATOR EXTENDED TIP APPLICATOR, SINGLE USE APPLICATOR, 15CM: Brand: EXTENDED TIP APPLICATOR

## (undated) DEVICE — 11.1-8GX20.3CMBEVTIPPININTRODU

## (undated) DEVICE — PRECISION MATCH HEAD

## (undated) DEVICE — PAD THRP WRPON PLR LNG STRAP

## (undated) DEVICE — 3 ML SYRINGE LUER-LOCK TIP: Brand: MONOJECT

## (undated) DEVICE — 11.1-M5 MULTIMODALITY KIT 5

## (undated) DEVICE — PACK SRG UNV 1 STRL LF

## (undated) DEVICE — CONTAINER SPEC STR 4OZ GRY LID

## (undated) DEVICE — DRAPE SLUSH/WARMER W/DISC

## (undated) DEVICE — GAMMEX® PI HYBRID SIZE 8, STERILE POWDER-FREE SURGICAL GLOVE, POLYISOPRENE AND NEOPRENE BLEND: Brand: GAMMEX

## (undated) DEVICE — OCCLUSIVE GAUZE STRIP,3% BISMUTH TRIBROMOPHENATE IN PETROLATUM BLEND: Brand: XEROFORM

## (undated) DEVICE — PRONEVIEW CUSHION INSERTS LRG

## (undated) DEVICE — 20 ML SYRINGE LUER-LOCK TIP: Brand: MONOJECT

## (undated) DEVICE — PRECISION ROUND

## (undated) DEVICE — SUTURE ETHILON 3-0 669H

## (undated) DEVICE — 3.0MM FINE DIAMOND

## (undated) DEVICE — LAMINECTOMY: Brand: MEDLINE INDUSTRIES, INC.

## (undated) DEVICE — SUTURE VICRYL 0 CT-1

## (undated) DEVICE — GAMMEX® PI HYBRID SIZE 7.5, STERILE POWDER-FREE SURGICAL GLOVE, POLYISOPRENE AND NEOPRENE BLEND: Brand: GAMMEX

## (undated) DEVICE — DRAPE SRG 150X54IN LEICA

## (undated) DEVICE — CATH IV 18GA .0500-.0530IN

## (undated) DEVICE — SUTURE VICRYL 2-0 CT-2

## (undated) DEVICE — C-ARMOR C-ARM EQUIPMENT COVERS CLEAR STERILE UNIVERSAL FIT 12 PER CASE: Brand: C-ARMOR

## (undated) NOTE — LETTER
November 30, 2017         Elis Hilario DO  [address]      Patient: Alexandru Healy   YOB: 1965   Date of Visit: 11/30/2017       Dear Dr. Kim Hopkins,    I saw your patient, Billie Moore

## (undated) NOTE — LETTER
[address]rez Dub 36   Date:   10/10/2019     Name:   Zay Negrete    YOB: 1965   MRN:   [id number]       WHERE IS YOUR PAIN NOW? Raffy the areas on your body where you feel the described sensations.   Use the appropria

## (undated) NOTE — LETTER
02/15/21        Warren Khan  [address] [address]      Dear Lewis Ruiz,    [facility] records indicate that you have outstanding lab work and or testing that was ordered for you and has not yet been completed:  Orders Placed This Encounter

## (undated) NOTE — MR AVS SNAPSHOT
[address]  [phone]               Thank you for choosing us for your health care visit with Eleno Cardoso MD.  We are glad to serve you and happy to provide you with this summary of your visit. MyChart     Visit [facility]  You can access your MyChart to more actively manage your health care and view more details from this visit by going to [email or web address].   If you've recently had a stay at the Hospital you can access your discharge ins

## (undated) NOTE — LETTER
08/05/20      [address] [place] [id number]      Dear Lora Busby,    [facility] records indicate that you have outstanding lab work and or testing that was ordered for you and has not yet been completed:  Orders Placed This Encounter

## (undated) NOTE — LETTER
12/05/20        [address] [place] [address]      Dear Calli Quiros,    [facility] records indicate that you have outstanding lab work and or testing that was ordered for you and has not yet been completed:  Orders Placed This Encounter

## (undated) NOTE — LETTER
10/24/19      [place] [id number]      Dear Genesis Ozuna,    [facility] records indicate that you have outstanding lab work and or testing that was ordered for you and has not yet been completed:  Orders Placed This Encounter

## (undated) NOTE — LETTER
05/15/21        [address] [place] [id number]      Dear Deidre Shell,    [facility] records indicate that you have outstanding lab work and or testing that was ordered for you and has not yet been completed:  Orders Placed This Encounter

## (undated) NOTE — MR AVS SNAPSHOT
[facility]  [address] [place] [address], [address] [place] [address]  [phone]               Thank you for choosing us for your health care visit with Solitario Hillman DPM.  We are glad to serve you and happy to provide you with this summary of yo Summaries. If you've been to the Emergency Department or your doctor's office, you can view your past visit information in [facility] by going to Visits < Visit Summaries. [facility]ivel questions? Call [phone] for help.   [facility] is NOT to be used for urge

## (undated) NOTE — LETTER
[address]rez [address] 36   Date:   2/3/2021     Name:   Mars Powell    YOB: 1965   MRN:   [id number]       WHERE IS YOUR PAIN NOW? Raffy the areas on your body where you feel the described sensations.   Use the appropr

## (undated) NOTE — Clinical Note
YANI, pt dc'd from [facility] 4/18 after revision of spinal surgery. States that she is following spinal surgeon and is doing well.